# Patient Record
Sex: FEMALE | Race: BLACK OR AFRICAN AMERICAN | NOT HISPANIC OR LATINO
[De-identification: names, ages, dates, MRNs, and addresses within clinical notes are randomized per-mention and may not be internally consistent; named-entity substitution may affect disease eponyms.]

---

## 2019-01-30 ENCOUNTER — TRANSCRIPTION ENCOUNTER (OUTPATIENT)
Age: 29
End: 2019-01-30

## 2019-11-21 ENCOUNTER — RESULT REVIEW (OUTPATIENT)
Age: 29
End: 2019-11-21

## 2019-12-10 ENCOUNTER — APPOINTMENT (OUTPATIENT)
Dept: ANTEPARTUM | Facility: CLINIC | Age: 29
End: 2019-12-10
Payer: COMMERCIAL

## 2019-12-10 ENCOUNTER — LABORATORY RESULT (OUTPATIENT)
Age: 29
End: 2019-12-10

## 2019-12-10 ENCOUNTER — ASOB RESULT (OUTPATIENT)
Age: 29
End: 2019-12-10

## 2019-12-10 PROCEDURE — 76813 OB US NUCHAL MEAS 1 GEST: CPT

## 2019-12-10 PROCEDURE — 36416 COLLJ CAPILLARY BLOOD SPEC: CPT

## 2019-12-10 PROCEDURE — 76801 OB US < 14 WKS SINGLE FETUS: CPT

## 2019-12-24 PROBLEM — Z00.00 ENCOUNTER FOR PREVENTIVE HEALTH EXAMINATION: Status: ACTIVE | Noted: 2019-12-24

## 2020-01-07 ENCOUNTER — APPOINTMENT (OUTPATIENT)
Dept: ANTEPARTUM | Facility: CLINIC | Age: 30
End: 2020-01-07

## 2020-04-26 ENCOUNTER — MESSAGE (OUTPATIENT)
Age: 30
End: 2020-04-26

## 2020-08-08 ENCOUNTER — TRANSCRIPTION ENCOUNTER (OUTPATIENT)
Age: 30
End: 2020-08-08

## 2021-03-30 ENCOUNTER — APPOINTMENT (OUTPATIENT)
Dept: DERMATOLOGY | Facility: CLINIC | Age: 31
End: 2021-03-30

## 2021-04-07 ENCOUNTER — APPOINTMENT (OUTPATIENT)
Dept: DERMATOLOGY | Facility: CLINIC | Age: 31
End: 2021-04-07
Payer: COMMERCIAL

## 2021-04-07 ENCOUNTER — NON-APPOINTMENT (OUTPATIENT)
Age: 31
End: 2021-04-07

## 2021-04-07 VITALS — HEIGHT: 68 IN | BODY MASS INDEX: 24.25 KG/M2 | WEIGHT: 160 LBS

## 2021-04-07 DIAGNOSIS — L91.0 HYPERTROPHIC SCAR: ICD-10-CM

## 2021-04-07 DIAGNOSIS — L81.0 POSTINFLAMMATORY HYPERPIGMENTATION: ICD-10-CM

## 2021-04-07 DIAGNOSIS — L73.0 ACNE KELOID: ICD-10-CM

## 2021-04-07 PROCEDURE — 99204 OFFICE O/P NEW MOD 45 MIN: CPT

## 2021-04-07 PROCEDURE — 99072 ADDL SUPL MATRL&STAF TM PHE: CPT

## 2021-04-07 RX ORDER — TRETINOIN 0.5 MG/G
0.05 CREAM TOPICAL
Qty: 1 | Refills: 6 | Status: ACTIVE | COMMUNITY
Start: 2021-04-07 | End: 1900-01-01

## 2021-07-26 ENCOUNTER — APPOINTMENT (OUTPATIENT)
Dept: NEUROLOGY | Facility: CLINIC | Age: 31
End: 2021-07-26

## 2021-08-11 ENCOUNTER — TRANSCRIPTION ENCOUNTER (OUTPATIENT)
Age: 31
End: 2021-08-11

## 2021-11-14 ENCOUNTER — EMERGENCY (EMERGENCY)
Facility: HOSPITAL | Age: 31
LOS: 1 days | Discharge: HOME | End: 2021-11-14
Attending: STUDENT IN AN ORGANIZED HEALTH CARE EDUCATION/TRAINING PROGRAM | Admitting: EMERGENCY MEDICINE
Payer: COMMERCIAL

## 2021-11-14 VITALS
RESPIRATION RATE: 16 BRPM | HEART RATE: 76 BPM | SYSTOLIC BLOOD PRESSURE: 116 MMHG | DIASTOLIC BLOOD PRESSURE: 72 MMHG | OXYGEN SATURATION: 100 %

## 2021-11-14 VITALS
HEART RATE: 78 BPM | RESPIRATION RATE: 16 BRPM | DIASTOLIC BLOOD PRESSURE: 75 MMHG | SYSTOLIC BLOOD PRESSURE: 115 MMHG | OXYGEN SATURATION: 97 % | TEMPERATURE: 98 F

## 2021-11-14 PROCEDURE — 99284 EMERGENCY DEPT VISIT MOD MDM: CPT

## 2021-11-14 RX ORDER — METOCLOPRAMIDE HCL 10 MG
10 TABLET ORAL ONCE
Refills: 0 | Status: COMPLETED | OUTPATIENT
Start: 2021-11-14 | End: 2021-11-14

## 2021-11-14 RX ORDER — SODIUM CHLORIDE 9 MG/ML
1000 INJECTION INTRAMUSCULAR; INTRAVENOUS; SUBCUTANEOUS ONCE
Refills: 0 | Status: COMPLETED | OUTPATIENT
Start: 2021-11-14 | End: 2021-11-14

## 2021-11-14 RX ORDER — ACETAMINOPHEN 500 MG
975 TABLET ORAL ONCE
Refills: 0 | Status: COMPLETED | OUTPATIENT
Start: 2021-11-14 | End: 2021-11-14

## 2021-11-14 RX ORDER — DIPHENHYDRAMINE HCL 50 MG
25 CAPSULE ORAL ONCE
Refills: 0 | Status: COMPLETED | OUTPATIENT
Start: 2021-11-14 | End: 2021-11-14

## 2021-11-14 RX ORDER — MAGNESIUM SULFATE 500 MG/ML
2 VIAL (ML) INJECTION ONCE
Refills: 0 | Status: COMPLETED | OUTPATIENT
Start: 2021-11-14 | End: 2021-11-14

## 2021-11-14 RX ADMIN — Medication 50 GRAM(S): at 20:52

## 2021-11-14 RX ADMIN — SODIUM CHLORIDE 1000 MILLILITER(S): 9 INJECTION INTRAMUSCULAR; INTRAVENOUS; SUBCUTANEOUS at 20:45

## 2021-11-14 RX ADMIN — Medication 975 MILLIGRAM(S): at 20:38

## 2021-11-14 RX ADMIN — Medication 25 MILLIGRAM(S): at 20:46

## 2021-11-14 RX ADMIN — Medication 10 MILLIGRAM(S): at 20:43

## 2021-11-14 NOTE — ED PROVIDER NOTE - PHYSICAL EXAMINATION
Attending MD Moyer :   PHYSICAL EXAM:    GENERAL: NAD  HEENT:  Atraumatic  CHEST/LUNG: Chest rise equal bilaterally  HEART: Regular rate and rhythm  ABDOMEN: Soft, Nontender, Nondistended  EXTREMITIES:  Extremities warm  PSYCH: A&Ox3  SKIN: No obvious rashes or lesions  NEUROLOGY: strength and sensation intact in all extremities. CN 2 - 12 intact. Finger to nose test intact. No pronator drift. Ambulatory without difficulty.

## 2021-11-14 NOTE — ED PROVIDER NOTE - PATIENT PORTAL LINK FT
You can access the FollowMyHealth Patient Portal offered by [facility] by registering at the following website: [email or web address]. By joining [employer]’s FollowMyHealth portal, you will also be able to view your health information using other applications (apps) compatible with our system.

## 2021-11-14 NOTE — ED ADULT NURSE NOTE - NSIMPLEMENTINTERV_GEN_ALL_ED
Implemented All Universal Safety Interventions:  [place] to call system. Call bell, personal items and telephone within reach. Instruct patient to call for assistance. Room bathroom lighting operational. Non-slip footwear when patient is off stretcher. Physically safe environment: no spills, clutter or unnecessary equipment. Stretcher in lowest position, wheels locked, appropriate side rails in place.

## 2021-11-14 NOTE — ED ADULT TRIAGE NOTE - CHIEF COMPLAINT QUOTE
pt c/o headache x 3 days. pt w hx of migraines, endorses getting them monthly during menstrual cycle, however pain is worse this time. pt denies vision changes, chest pain, numbness/tingling, dizziness.

## 2021-11-14 NOTE — ED ADULT NURSE NOTE - OBJECTIVE STATEMENT
Pt received to intake 5, A&OX4, ambulatory. C./o migraine that began about 3 days ago. States she gets migraines during her menstrual cycles, but not in between menstruation. States she took ibuprofen 800mg at home without relief. Denies CP, SOB, dizziness, blurry vision, photophobia, fatigue. 20G IV placed to L AC. Medicated per orders. NSR on CM. Will continue to monitor.

## 2021-11-14 NOTE — ED PROVIDER NOTE - OBJECTIVE STATEMENT
30 y/o female with a hx of Migraines presents to the ER c/o 3 days of left sided headache.  Pt states migraine is similar to her typical symptoms but more intense.  Pt is followed by a neurologist.  Pt denies chest pain, sob, nausea, vomiting, fevers, chills, change in vision, weakness, dizziness. 30 y/o female with a hx of Migraines presents to the ER c/o 3 days of left sided dull headache.  Pt states migraine is similar to her typical symptoms but more intense.  Usually gets these symptoms around the time of her menstruation. No other assoc sxs. No N/V/weakness/numbness/tingling. Pt is followed by a neurologist whom recommend motrin for abortive therapy, but this has not been working.

## 2021-11-14 NOTE — ED PROVIDER NOTE - CLINICAL SUMMARY MEDICAL DECISION MAKING FREE TEXT BOX
32 y/o female with a hx of Migraines presents to the ER c/o 3 days of left sided headache.  Pt states migraine is similar to her typical symptoms but more intense.  Pt is followed by a neurologist. Pt is well appearing, NAD, normal neuro exam, supportive care, follow up Neuro. 32 y/o female with a hx of Migraines presents to the ER c/o 3 days of left sided headache.  Pt states migraine is similar to her typical symptoms but more intense.  Pt is followed by a neurologist. Pt is well appearing, NAD, normal neuro exam, supportive care, follow up Neuro.    Attending MD Moyer: Agree with above. Pt here with L sided headache typoical of her usual migraine headaches. Neuro exam normal. Patient appears extremely comfortbale, texting and talking on her phone. VSS. Will give meds and reassess. Likely d/c.

## 2021-11-14 NOTE — ED PROVIDER NOTE - NSFOLLOWUPINSTRUCTIONS_ED_ALL_ED_FT
Please return to the ED for any concerns, including worsening headache, nausea, vomiting, weakness, numbness or any other concerns.     You may take tylenol 1000mg and ibuprofen 400mg every 6 hours as needed for pain.    Please follow-up with your primary care doctor in the next 3 days.

## 2021-11-29 ENCOUNTER — TRANSCRIPTION ENCOUNTER (OUTPATIENT)
Age: 31
End: 2021-11-29

## 2022-03-31 PROBLEM — G43.909 MIGRAINE, UNSPECIFIED, NOT INTRACTABLE, WITHOUT STATUS MIGRAINOSUS: Chronic | Status: ACTIVE | Noted: 2021-11-14

## 2022-05-29 ENCOUNTER — EMERGENCY (EMERGENCY)
Facility: HOSPITAL | Age: 32
LOS: 1 days | Discharge: HOME | End: 2022-05-29
Attending: EMERGENCY MEDICINE | Admitting: STUDENT IN AN ORGANIZED HEALTH CARE EDUCATION/TRAINING PROGRAM
Payer: COMMERCIAL

## 2022-05-29 VITALS
OXYGEN SATURATION: 98 % | HEART RATE: 125 BPM | TEMPERATURE: 98 F | DIASTOLIC BLOOD PRESSURE: 80 MMHG | RESPIRATION RATE: 18 BRPM | SYSTOLIC BLOOD PRESSURE: 126 MMHG

## 2022-05-29 LAB
ALBUMIN SERPL ELPH-MCNC: 3.6 G/DL — SIGNIFICANT CHANGE UP (ref 3.3–5)
ALP SERPL-CCNC: 58 U/L — SIGNIFICANT CHANGE UP (ref 40–120)
ALT FLD-CCNC: 25 U/L — SIGNIFICANT CHANGE UP (ref 4–33)
ANION GAP SERPL CALC-SCNC: 11 MMOL/L — SIGNIFICANT CHANGE UP (ref 7–14)
ANISOCYTOSIS BLD QL: SLIGHT — SIGNIFICANT CHANGE UP
AST SERPL-CCNC: 22 U/L — SIGNIFICANT CHANGE UP (ref 4–32)
BASOPHILS # BLD AUTO: 0.08 K/UL — SIGNIFICANT CHANGE UP (ref 0–0.2)
BASOPHILS NFR BLD AUTO: 0.9 % — SIGNIFICANT CHANGE UP (ref 0–2)
BILIRUB SERPL-MCNC: 0.5 MG/DL — SIGNIFICANT CHANGE UP (ref 0.2–1.2)
BLD GP AB SCN SERPL QL: NEGATIVE — SIGNIFICANT CHANGE UP
BUN SERPL-MCNC: 7 MG/DL — SIGNIFICANT CHANGE UP (ref 7–23)
CALCIUM SERPL-MCNC: 8.3 MG/DL — LOW (ref 8.4–10.5)
CHLORIDE SERPL-SCNC: 104 MMOL/L — SIGNIFICANT CHANGE UP (ref 98–107)
CO2 SERPL-SCNC: 23 MMOL/L — SIGNIFICANT CHANGE UP (ref 22–31)
CREAT SERPL-MCNC: 0.87 MG/DL — SIGNIFICANT CHANGE UP (ref 0.5–1.3)
EGFR: 92 ML/MIN/1.73M2 — SIGNIFICANT CHANGE UP
EOSINOPHIL # BLD AUTO: 0.08 K/UL — SIGNIFICANT CHANGE UP (ref 0–0.5)
EOSINOPHIL NFR BLD AUTO: 0.9 % — SIGNIFICANT CHANGE UP (ref 0–6)
FLUAV AG NPH QL: SIGNIFICANT CHANGE UP
FLUBV AG NPH QL: SIGNIFICANT CHANGE UP
GIANT PLATELETS BLD QL SMEAR: PRESENT — SIGNIFICANT CHANGE UP
GLUCOSE SERPL-MCNC: 115 MG/DL — HIGH (ref 70–99)
HCG SERPL-ACNC: <5 MIU/ML — SIGNIFICANT CHANGE UP
HCT VFR BLD CALC: 22.1 % — LOW (ref 34.5–45)
HGB BLD-MCNC: 6.8 G/DL — CRITICAL LOW (ref 11.5–15.5)
IANC: 5.64 K/UL — SIGNIFICANT CHANGE UP (ref 1.8–7.4)
LYMPHOCYTES # BLD AUTO: 2.01 K/UL — SIGNIFICANT CHANGE UP (ref 1–3.3)
LYMPHOCYTES # BLD AUTO: 21.4 % — SIGNIFICANT CHANGE UP (ref 13–44)
MACROCYTES BLD QL: SLIGHT — SIGNIFICANT CHANGE UP
MANUAL SMEAR VERIFICATION: SIGNIFICANT CHANGE UP
MCHC RBC-ENTMCNC: 28.2 PG — SIGNIFICANT CHANGE UP (ref 27–34)
MCHC RBC-ENTMCNC: 30.8 GM/DL — LOW (ref 32–36)
MCV RBC AUTO: 91.7 FL — SIGNIFICANT CHANGE UP (ref 80–100)
MONOCYTES # BLD AUTO: 0.08 K/UL — SIGNIFICANT CHANGE UP (ref 0–0.9)
MONOCYTES NFR BLD AUTO: 0.9 % — LOW (ref 2–14)
NEUTROPHILS # BLD AUTO: 7.14 K/UL — SIGNIFICANT CHANGE UP (ref 1.8–7.4)
NEUTROPHILS NFR BLD AUTO: 75.9 % — SIGNIFICANT CHANGE UP (ref 43–77)
NRBC # BLD: 2 /100 — HIGH (ref 0–0)
PLAT MORPH BLD: ABNORMAL
PLATELET # BLD AUTO: 293 K/UL — SIGNIFICANT CHANGE UP (ref 150–400)
PLATELET COUNT - ESTIMATE: NORMAL — SIGNIFICANT CHANGE UP
POTASSIUM SERPL-MCNC: 3.6 MMOL/L — SIGNIFICANT CHANGE UP (ref 3.5–5.3)
POTASSIUM SERPL-SCNC: 3.6 MMOL/L — SIGNIFICANT CHANGE UP (ref 3.5–5.3)
PROT SERPL-MCNC: 6.6 G/DL — SIGNIFICANT CHANGE UP (ref 6–8.3)
RBC # BLD: 2.41 M/UL — LOW (ref 3.8–5.2)
RBC # FLD: 14.2 % — SIGNIFICANT CHANGE UP (ref 10.3–14.5)
RBC BLD AUTO: SIGNIFICANT CHANGE UP
RH IG SCN BLD-IMP: POSITIVE — SIGNIFICANT CHANGE UP
RH IG SCN BLD-IMP: POSITIVE — SIGNIFICANT CHANGE UP
RSV RNA NPH QL NAA+NON-PROBE: SIGNIFICANT CHANGE UP
SARS-COV-2 RNA SPEC QL NAA+PROBE: SIGNIFICANT CHANGE UP
SODIUM SERPL-SCNC: 138 MMOL/L — SIGNIFICANT CHANGE UP (ref 135–145)
WBC # BLD: 9.41 K/UL — SIGNIFICANT CHANGE UP (ref 3.8–10.5)
WBC # FLD AUTO: 9.41 K/UL — SIGNIFICANT CHANGE UP (ref 3.8–10.5)

## 2022-05-29 PROCEDURE — 99218: CPT

## 2022-05-29 RX ORDER — SODIUM CHLORIDE 9 MG/ML
1000 INJECTION INTRAMUSCULAR; INTRAVENOUS; SUBCUTANEOUS ONCE
Refills: 0 | Status: COMPLETED | OUTPATIENT
Start: 2022-05-29 | End: 2022-05-29

## 2022-05-29 RX ORDER — ACETAMINOPHEN 500 MG
650 TABLET ORAL ONCE
Refills: 0 | Status: COMPLETED | OUTPATIENT
Start: 2022-05-29 | End: 2022-05-29

## 2022-05-29 RX ORDER — KETOROLAC TROMETHAMINE 30 MG/ML
30 SYRINGE (ML) INJECTION ONCE
Refills: 0 | Status: DISCONTINUED | OUTPATIENT
Start: 2022-05-29 | End: 2022-05-29

## 2022-05-29 RX ORDER — METOCLOPRAMIDE HCL 10 MG
10 TABLET ORAL ONCE
Refills: 0 | Status: COMPLETED | OUTPATIENT
Start: 2022-05-29 | End: 2022-05-29

## 2022-05-29 RX ADMIN — SODIUM CHLORIDE 1000 MILLILITER(S): 9 INJECTION INTRAMUSCULAR; INTRAVENOUS; SUBCUTANEOUS at 19:31

## 2022-05-29 RX ADMIN — Medication 30 MILLIGRAM(S): at 20:51

## 2022-05-29 RX ADMIN — Medication 650 MILLIGRAM(S): at 19:31

## 2022-05-29 RX ADMIN — Medication 1 TABLET(S): at 23:32

## 2022-05-29 RX ADMIN — Medication 10 MILLIGRAM(S): at 19:31

## 2022-05-29 RX ADMIN — Medication 650 MILLIGRAM(S): at 20:52

## 2022-05-29 NOTE — ED PROVIDER NOTE - NS ED ATTENDING STATEMENT MOD
This was a shared visit with the HIRAM. I reviewed and verified the documentation and independently performed the documented:

## 2022-05-29 NOTE — ED ADULT NURSE NOTE - OBJECTIVE STATEMENT
Pt received from shantal RN, to room intake 12. Patient with C/O HA, states it began today with no other symptoms. Patient with hx of migraines, believes it is an exacerbation of this. Patient S/P BBL x2 days. Patient denies any complications r/t surgery. No fevers, chills, incision drainage, vision changes, neuro deficits noted. 20G IV placed to LAC by dayshift RN. Awaiting further orders at this time.

## 2022-05-29 NOTE — ED ADULT TRIAGE NOTE - TEMPERATURE IN CELSIUS (DEGREES C)
Recommend Palliative Care consult.    Topical Recommendations:  Tracheostomy- Cleanse around trach site with NS. Pat dry. Apply Silicone foam dressing without border beneath trach collar, cut mid dressing to "Y" shape. Change foam dressing every shift and prn. Change trach ties every 3 days (if safe to do so, patient with critical airway). Place small z-toni positioning device behind head beginning below the shoulders for slight hyperextension of neck to prevent increased pressure and moisture caused by head flexion.     Nasogastric Tube- Cleanse nare with NS. Pat dry. Apply Liquid barrier film to periwound skin. Apply Stat-lock NGT ann over center of nare, not touch any skin circumferentially. Change every three days or prn if soiled or compromised.     Moisture associated dermatitis to intertriginous fold of neck- gently cleanse with NS, pat dry. Apply 3M Liquid Barrier Advanced three times a week. Apply Interdry textile sheeting,  leaving 2 inches exposed at ends to wick, remove to wash & dry affected area, then replace. Individual sheeting may be used for up to 5 days unless soiled.     Risk for moisture associated dermatitis to abdominal pannus- cleanse with luke-warm soap and water, dry well. Apply Interdry textile sheeting, under intertriginous folds leaving 2 inches exposed at ends to wick, remove to wash & dry affected area, then replace. Individual sheeting may be used for up to 5 days unless soiled.     Continue low air loss bed therapy, continue heel elevation with Z-flex fluidized positioning boots, continue to turn & reposition q2h with Z-toni fluidized positioning device, continue moisture management with mayela moisture barrier cream to sacral fold and bilateral buttocks & single breathable pad, continue measures to decrease friction/shear/pressure.     Continue with nutritional support as per dietary/orders.    Findings and plan discussed with primary RN and primary. All questions and concerns addressed.    Please contact Wound Care Service Line if we can be of further assistance (ext [phone]).  Right thumb: Apply Liquid barrier film. Daily.    PEG: Cleanse q shift with NS, apply liquid barrier film beneath candelario disc.  If redness noted under candelario disc bumper apply thin foam  dressing without border (Mepilex Lite)- cut to mid dressing with a 'Y' shape.   Secondary securement to abdomen taping in 'H' fashion with Steri-strips.     Continue low air loss bed therapy, continue heel elevation, continue to turn & reposition q2h, continue moisture management with barrier creams & single breathable pad, continue measures to decrease friction/shear/pressure. Continue with nutritional support as per dietary/orders.     Please call wound care service line is further assistance is needed ([phone]).  36.7

## 2022-05-29 NOTE — ED PROVIDER NOTE - PROGRESS NOTE DETAILS
PA Smartt: patient H/H: 6.8/22.1. patient denies actively bleeding. anemia likely due to recent surgurical procedure. patient accepted to CDU for transfusion of 2 units.

## 2022-05-29 NOTE — ED CDU PROVIDER INITIAL DAY NOTE - OBJECTIVE STATEMENT
32 y/o female with a PMHx of migraines presents to the ED with exacerbation of headache as of 2 days Headache is described as a pressure sensation located to the frontal and bilateral region. Pt denies visual disruption, nausea, vomiting, slurred speech, weakness, and unsteady gait. denies any chest pain, sob, leia swelling, or post surgical wound complications. Pt is 4 day status post Brazilian Butt lift procedure and believes that anesthesia is the cause of the headache.  IN ER given meds with some relief, however on labs, hgb found to be 6.8. pty will be sent to cdu for PRBC, meds, reasses

## 2022-05-29 NOTE — ED CDU PROVIDER INITIAL DAY NOTE - ATTENDING APP SHARED VISIT CONTRIBUTION OF CARE
Please see my ED provider note for more details with regards to the HPI and MDM    in review the patient has migraines and history of migraines.  Could be typical migraine but also found to be profoundly anemic in the setting of recent surgery.  No signs of surgical complications.  Will transfuse as may be contributing to the HA.  Will be transfused and reassessed in the CDU

## 2022-05-29 NOTE — ED PROVIDER NOTE - OBJECTIVE STATEMENT
32 y/o female with a PMHx of migraines presents to the ED with exacerbation of headache as of today. Headache is described as a pressure sensation located to the frontal and bilateral region. Pt denies visual disruption, nausea, vomiting, slurred speech, weakness, and unsteady gait. Pt is 1 day status post BBL and believes that anesthesia is the cause of the headache. No other acute complaints noted such as chest pain, shortness of breath, lower leg swelling, and issues of post surgical wound. 30 y/o female with a PMHx of migraines presents to the ED with exacerbation of headache as of today. Headache is described as a pressure sensation located to the frontal and bilateral region. Pt denies visual disruption, nausea, vomiting, slurred speech, weakness, and unsteady gait. Pt is 4 day status post Brazilian Butt lift procedure and believes that anesthesia is the cause of the headache. No other acute complaints noted such as chest pain, shortness of breath, lower leg swelling, and issues of post surgical wound.

## 2022-05-29 NOTE — ED ADULT TRIAGE NOTE - CHIEF COMPLAINT QUOTE
p/t s/p but lift sx 4 days ago, c.o of headaches for 3 days, denies any other discomfort, no neuro deficits noted

## 2022-05-29 NOTE — ED CDU PROVIDER INITIAL DAY NOTE - MEDICAL DECISION MAKING DETAILS
32 y/o female with a PMHx of migraines presents to the ED with exacerbation of headache as of 2 days Headache is described as a pressure sensation located to the frontal and bilateral region. Pt denies visual disruption, nausea, vomiting, slurred speech, weakness, and unsteady gait. denies any chest pain, sob, leia swelling, or post surgical wound complications. Pt is 4 day status post Brazilian Butt lift procedure and believes that anesthesia is the cause of the headache.  IN ER given meds with some relief, exam was wnl  however on labs, hgb found to be 6.8. pty will be sent to cdu for PRBC, meds, reasses

## 2022-05-30 VITALS
HEART RATE: 96 BPM | TEMPERATURE: 98 F | RESPIRATION RATE: 17 BRPM | DIASTOLIC BLOOD PRESSURE: 80 MMHG | OXYGEN SATURATION: 100 % | SYSTOLIC BLOOD PRESSURE: 121 MMHG

## 2022-05-30 LAB
HCT VFR BLD CALC: 27.8 % — LOW (ref 34.5–45)
HGB BLD-MCNC: 9 G/DL — LOW (ref 11.5–15.5)
MCHC RBC-ENTMCNC: 28.3 PG — SIGNIFICANT CHANGE UP (ref 27–34)
MCHC RBC-ENTMCNC: 32.4 GM/DL — SIGNIFICANT CHANGE UP (ref 32–36)
MCV RBC AUTO: 87.4 FL — SIGNIFICANT CHANGE UP (ref 80–100)
NRBC # BLD: 0 /100 WBCS — SIGNIFICANT CHANGE UP
NRBC # FLD: 0.02 K/UL — HIGH
PLATELET # BLD AUTO: 248 K/UL — SIGNIFICANT CHANGE UP (ref 150–400)
RBC # BLD: 3.18 M/UL — LOW (ref 3.8–5.2)
RBC # FLD: 14.3 % — SIGNIFICANT CHANGE UP (ref 10.3–14.5)
WBC # BLD: 8.72 K/UL — SIGNIFICANT CHANGE UP (ref 3.8–10.5)
WBC # FLD AUTO: 8.72 K/UL — SIGNIFICANT CHANGE UP (ref 3.8–10.5)

## 2022-05-30 PROCEDURE — 99217: CPT

## 2022-05-30 RX ORDER — DOCUSATE SODIUM 100 MG
1 CAPSULE ORAL
Qty: 28 | Refills: 0
Start: 2022-05-30 | End: 2022-06-12

## 2022-05-30 RX ORDER — IBUPROFEN 200 MG
600 TABLET ORAL ONCE
Refills: 0 | Status: COMPLETED | OUTPATIENT
Start: 2022-05-30 | End: 2022-05-30

## 2022-05-30 RX ORDER — FERROUS SULFATE 325(65) MG
1 TABLET ORAL
Qty: 14 | Refills: 0
Start: 2022-05-30 | End: 2022-06-12

## 2022-05-30 RX ORDER — KETOROLAC TROMETHAMINE 30 MG/ML
15 SYRINGE (ML) INJECTION ONCE
Refills: 0 | Status: DISCONTINUED | OUTPATIENT
Start: 2022-05-30 | End: 2022-05-30

## 2022-05-30 RX ORDER — ACETAMINOPHEN 500 MG
650 TABLET ORAL ONCE
Refills: 0 | Status: COMPLETED | OUTPATIENT
Start: 2022-05-30 | End: 2022-05-30

## 2022-05-30 RX ORDER — METOCLOPRAMIDE HCL 10 MG
10 TABLET ORAL ONCE
Refills: 0 | Status: COMPLETED | OUTPATIENT
Start: 2022-05-30 | End: 2022-05-30

## 2022-05-30 RX ADMIN — Medication 650 MILLIGRAM(S): at 08:01

## 2022-05-30 RX ADMIN — Medication 600 MILLIGRAM(S): at 00:18

## 2022-05-30 RX ADMIN — Medication 15 MILLIGRAM(S): at 10:56

## 2022-05-30 RX ADMIN — Medication 10 MILLIGRAM(S): at 10:56

## 2022-05-30 RX ADMIN — Medication 1 TABLET(S): at 10:56

## 2022-05-30 RX ADMIN — Medication 650 MILLIGRAM(S): at 08:31

## 2022-05-30 RX ADMIN — Medication 15 MILLIGRAM(S): at 11:26

## 2022-05-30 RX ADMIN — Medication 600 MILLIGRAM(S): at 01:18

## 2022-05-30 NOTE — ED CDU PROVIDER SUBSEQUENT DAY NOTE - HISTORY
30 y/o female with a PMHx of migraines presents to the ED with exacerbation of headache as of 2 days Headache is described as a pressure sensation located to the frontal and bilateral region. Pt denies visual disruption, nausea, vomiting, slurred speech, weakness, and unsteady gait. denies any chest pain, sob, leia swelling, or post surgical wound complications. Pt is 4 day status post Brazilian Butt lift procedure and believes that anesthesia is the cause of the headache.  IN ER given meds with some relief, however on labs, hgb found to be 6.8. pty will be sent to cdu for PRBC, meds, reasses

## 2022-05-30 NOTE — ED CDU PROVIDER DISPOSITION NOTE - ATTENDING CONTRIBUTION TO CARE
32 yo f past medical history migraines presented for HA after recent cosmetic surgery. found to be anemia sent to CDU for prbc and monitoring. symptoms improved. appropriate rise in h/h. stable for dc.

## 2022-05-30 NOTE — ED CDU PROVIDER DISPOSITION NOTE - PATIENT PORTAL LINK FT
You can access the FollowMyHealth Patient Portal offered by [facility] by registering at the following website: [email or web address]. By joining [employer]’s FollowMyHealth portal, you will also be able to view your health information using other applications (apps) compatible with our system.

## 2022-05-30 NOTE — ED CDU PROVIDER DISPOSITION NOTE - CLINICAL COURSE
Pt is a 32 y/o F PMHx migraines, recent BBL in [place] (5/25/22) p/w headache.  Pt reports developing pressure like headache at frontal and bilateral parietal region.  In ED, pt found to have anemia with Hgb of 6.8.  Pt denies any vaginal bleeding, melena or brbpr.  Pt placed in CDU for blood transfusion.  Pt reports improvement in headache after blood transfusion.  Repeat hemoglobin was *******. Pt is a 30 y/o F PMHx migraines, recent BBL in [place] (5/25/22) p/w headache.  Pt reports developing pressure like headache at frontal and bilateral parietal region.  In ED, pt found to have anemia with Hgb of 6.8.  Pt denies any vaginal bleeding, melena or brbpr.  Pt placed in CDU for blood transfusion.  Pt reports improvement in headache after blood transfusion.  Repeat hemoglobin was 9.0

## 2022-05-30 NOTE — ED CDU PROVIDER SUBSEQUENT DAY NOTE - ATTENDING APP SHARED VISIT CONTRIBUTION OF CARE
32 yo f past medical history migraines presented for HA after recent cosmetic surgery. found to be anemia sent to CDU for prbc and monitoring. symptoms improved. appropriate rise in h/h. exam as above. stable for dc.

## 2022-05-30 NOTE — ED CDU PROVIDER SUBSEQUENT DAY NOTE - PROGRESS NOTE DETAILS
cdu fifi botello: pt rested comfortably in bed overnight, reeceviing PRbcx2, had no compaints will reasses WALTER Lees:  Results endorsed including unexpected incidental findings (copy of reports provided to patient).  Pt reports feeling better.  Pt medically stable for discharge.  Strict return precautions given. Pt to follow up with PMD. Reassessment performed and plan for discharge discussed with Dr. Strong who agrees with disposition and discharge plan.

## 2022-05-30 NOTE — ED CDU PROVIDER DISPOSITION NOTE - NSFOLLOWUPINSTRUCTIONS_ED_ALL_ED_FT
Advance activity as tolerated.  Continue all previously prescribed medications as directed unless otherwise instructed.  Take Tylenol 650mg (Two 325 mg pills) every 4-6 hours as needed for pain or fevers. Take iron supplements as prescribed.  Take Colace 100 mg twice a day as needed for constipation.  Follow up with your primary care physician in 48-72 hours- bring copies of your results.  Return to the ER for worsening or persistent symptoms, including but not limited to worsening/persistent headache, lightheadedness, changes in vision/hearing, passing out, chest pain, shortness of breath, dizziness, difficulty walking, falls, and/or ANY NEW OR CONCERNING SYMPTOMS. If you have issues obtaining follow up, please call: [phone] to obtain a doctor or specialist who takes your insurance in your area.  You may call [phone] to make an appointment with the internal medicine clinic.

## 2022-07-07 ENCOUNTER — APPOINTMENT (OUTPATIENT)
Dept: NEUROLOGY | Facility: CLINIC | Age: 32
End: 2022-07-07

## 2022-08-01 ENCOUNTER — APPOINTMENT (OUTPATIENT)
Dept: DERMATOLOGY | Facility: CLINIC | Age: 32
End: 2022-08-01

## 2022-08-30 ENCOUNTER — NON-APPOINTMENT (OUTPATIENT)
Age: 32
End: 2022-08-30

## 2022-11-01 ENCOUNTER — EMERGENCY (EMERGENCY)
Facility: HOSPITAL | Age: 32
LOS: 1 days | Discharge: HOME | End: 2022-11-01
Attending: STUDENT IN AN ORGANIZED HEALTH CARE EDUCATION/TRAINING PROGRAM | Admitting: STUDENT IN AN ORGANIZED HEALTH CARE EDUCATION/TRAINING PROGRAM

## 2022-11-01 VITALS
RESPIRATION RATE: 16 BRPM | TEMPERATURE: 98 F | OXYGEN SATURATION: 100 % | SYSTOLIC BLOOD PRESSURE: 129 MMHG | DIASTOLIC BLOOD PRESSURE: 74 MMHG | HEART RATE: 64 BPM

## 2022-11-01 VITALS
HEART RATE: 70 BPM | TEMPERATURE: 98 F | SYSTOLIC BLOOD PRESSURE: 122 MMHG | DIASTOLIC BLOOD PRESSURE: 76 MMHG | OXYGEN SATURATION: 100 % | RESPIRATION RATE: 16 BRPM

## 2022-11-01 LAB
ALBUMIN SERPL ELPH-MCNC: 4.3 G/DL — SIGNIFICANT CHANGE UP (ref 3.3–5)
ALP SERPL-CCNC: 76 U/L — SIGNIFICANT CHANGE UP (ref 40–120)
ALT FLD-CCNC: 18 U/L — SIGNIFICANT CHANGE UP (ref 4–33)
ANION GAP SERPL CALC-SCNC: 9 MMOL/L — SIGNIFICANT CHANGE UP (ref 7–14)
AST SERPL-CCNC: 17 U/L — SIGNIFICANT CHANGE UP (ref 4–32)
BASOPHILS # BLD AUTO: 0.01 K/UL — SIGNIFICANT CHANGE UP (ref 0–0.2)
BASOPHILS NFR BLD AUTO: 0.1 % — SIGNIFICANT CHANGE UP (ref 0–2)
BILIRUB SERPL-MCNC: 0.3 MG/DL — SIGNIFICANT CHANGE UP (ref 0.2–1.2)
BUN SERPL-MCNC: 9 MG/DL — SIGNIFICANT CHANGE UP (ref 7–23)
CALCIUM SERPL-MCNC: 9 MG/DL — SIGNIFICANT CHANGE UP (ref 8.4–10.5)
CHLORIDE SERPL-SCNC: 105 MMOL/L — SIGNIFICANT CHANGE UP (ref 98–107)
CO2 SERPL-SCNC: 26 MMOL/L — SIGNIFICANT CHANGE UP (ref 22–31)
CREAT SERPL-MCNC: 0.65 MG/DL — SIGNIFICANT CHANGE UP (ref 0.5–1.3)
EGFR: 121 ML/MIN/1.73M2 — SIGNIFICANT CHANGE UP
EOSINOPHIL # BLD AUTO: 0.11 K/UL — SIGNIFICANT CHANGE UP (ref 0–0.5)
EOSINOPHIL NFR BLD AUTO: 1.6 % — SIGNIFICANT CHANGE UP (ref 0–6)
GLUCOSE SERPL-MCNC: 86 MG/DL — SIGNIFICANT CHANGE UP (ref 70–99)
HCG SERPL-ACNC: <5 MIU/ML — SIGNIFICANT CHANGE UP
HCT VFR BLD CALC: 37.9 % — SIGNIFICANT CHANGE UP (ref 34.5–45)
HGB BLD-MCNC: 11.9 G/DL — SIGNIFICANT CHANGE UP (ref 11.5–15.5)
IANC: 3.51 K/UL — SIGNIFICANT CHANGE UP (ref 1.8–7.4)
IMM GRANULOCYTES NFR BLD AUTO: 0.6 % — SIGNIFICANT CHANGE UP (ref 0–0.9)
LYMPHOCYTES # BLD AUTO: 2.9 K/UL — SIGNIFICANT CHANGE UP (ref 1–3.3)
LYMPHOCYTES # BLD AUTO: 41.5 % — SIGNIFICANT CHANGE UP (ref 13–44)
MCHC RBC-ENTMCNC: 29 PG — SIGNIFICANT CHANGE UP (ref 27–34)
MCHC RBC-ENTMCNC: 31.4 GM/DL — LOW (ref 32–36)
MCV RBC AUTO: 92.4 FL — SIGNIFICANT CHANGE UP (ref 80–100)
MONOCYTES # BLD AUTO: 0.42 K/UL — SIGNIFICANT CHANGE UP (ref 0–0.9)
MONOCYTES NFR BLD AUTO: 6 % — SIGNIFICANT CHANGE UP (ref 2–14)
NEUTROPHILS # BLD AUTO: 3.51 K/UL — SIGNIFICANT CHANGE UP (ref 1.8–7.4)
NEUTROPHILS NFR BLD AUTO: 50.2 % — SIGNIFICANT CHANGE UP (ref 43–77)
NRBC # BLD: 0 /100 WBCS — SIGNIFICANT CHANGE UP (ref 0–0)
NRBC # FLD: 0 K/UL — SIGNIFICANT CHANGE UP (ref 0–0)
PLATELET # BLD AUTO: 280 K/UL — SIGNIFICANT CHANGE UP (ref 150–400)
POTASSIUM SERPL-MCNC: 4 MMOL/L — SIGNIFICANT CHANGE UP (ref 3.5–5.3)
POTASSIUM SERPL-SCNC: 4 MMOL/L — SIGNIFICANT CHANGE UP (ref 3.5–5.3)
PROT SERPL-MCNC: 7.5 G/DL — SIGNIFICANT CHANGE UP (ref 6–8.3)
RBC # BLD: 4.1 M/UL — SIGNIFICANT CHANGE UP (ref 3.8–5.2)
RBC # FLD: 12.7 % — SIGNIFICANT CHANGE UP (ref 10.3–14.5)
SODIUM SERPL-SCNC: 140 MMOL/L — SIGNIFICANT CHANGE UP (ref 135–145)
WBC # BLD: 6.99 K/UL — SIGNIFICANT CHANGE UP (ref 3.8–10.5)
WBC # FLD AUTO: 6.99 K/UL — SIGNIFICANT CHANGE UP (ref 3.8–10.5)

## 2022-11-01 PROCEDURE — 99284 EMERGENCY DEPT VISIT MOD MDM: CPT

## 2022-11-01 RX ORDER — MAGNESIUM SULFATE 500 MG/ML
2 VIAL (ML) INJECTION ONCE
Refills: 0 | Status: COMPLETED | OUTPATIENT
Start: 2022-11-01 | End: 2022-11-01

## 2022-11-01 RX ORDER — METOCLOPRAMIDE HCL 10 MG
10 TABLET ORAL ONCE
Refills: 0 | Status: COMPLETED | OUTPATIENT
Start: 2022-11-01 | End: 2022-11-01

## 2022-11-01 RX ORDER — KETOROLAC TROMETHAMINE 30 MG/ML
15 SYRINGE (ML) INJECTION ONCE
Refills: 0 | Status: DISCONTINUED | OUTPATIENT
Start: 2022-11-01 | End: 2022-11-01

## 2022-11-01 RX ORDER — SODIUM CHLORIDE 9 MG/ML
1000 INJECTION INTRAMUSCULAR; INTRAVENOUS; SUBCUTANEOUS ONCE
Refills: 0 | Status: COMPLETED | OUTPATIENT
Start: 2022-11-01 | End: 2022-11-01

## 2022-11-01 RX ADMIN — Medication 10 MILLIGRAM(S): at 13:36

## 2022-11-01 RX ADMIN — Medication 25 GRAM(S): at 15:10

## 2022-11-01 RX ADMIN — Medication 15 MILLIGRAM(S): at 15:41

## 2022-11-01 RX ADMIN — Medication 15 MILLIGRAM(S): at 13:36

## 2022-11-01 RX ADMIN — SODIUM CHLORIDE 1000 MILLILITER(S): 9 INJECTION INTRAMUSCULAR; INTRAVENOUS; SUBCUTANEOUS at 13:37

## 2022-11-01 NOTE — ED ADULT TRIAGE NOTE - BEFAST LAST WELL KNOWN
SURGERY POST-OP NOTE      OPERATION ADMIT DATE POST-OP DAY    Appendectomy - Laparoscopic 1/25/2021 1 Day Post-Op     SUBJECTIVE    Ms. Ruddy Monet is a 34 year old female who is doing all right this afternoon. She continues to have some lower abdominal pain. States that the tylenol did not help with this but she will be trying norco. She has not been ambulating in the hallways yet but she did walk to the bathroom multiple times today.  Passing flatus but no BM yet.    PHYSICAL EXAM    Vital Signs   Last Value 24 Hour Range   Temperature 98.7 °F (37.1 °C) (01/26/21 1145) Temp  Min: 97.7 °F (36.5 °C)  Max: 99.7 °F (37.6 °C)   Pulse 96 (01/26/21 1145) Pulse  Min: 79  Max: 116   Respiratory 18 (01/26/21 1145) Resp  Min: 12  Max: 22   Blood Pressure (!) 133/102 (01/26/21 1145) BP  Min: 108/71  Max: 168/106   Pulse Oximetry 98 % (01/26/21 1145) SpO2  Min: 95 %  Max: 100 %   CVP   No data recorded     Weight: 61.8 kg (01/25/21 1508)  Admit weight:Weight: 61.8 kg (01/25/21 1508)  Constitutional: well developed, well nourished. No acute distress. Converses appropriately  Cardiovascular:  Normal heart rate. Extremities warm and well perfused.  Respiratory:  Non-labored breathing without accessory muscle use. No respiratory distress.    Abdomen:  Soft, non-distended. Tenderness to palpation to bilateral lower quadrants. Non-tender in bilateral upper quadrants. Lap sites are clean, dry, and intact without drainage present.        INTAKE & OUTPUT    This Shift 24 Hour Range   No intake/output data recorded. I/O last 3 completed shifts:  In: 2974.9 [P.O.:720; I.V.:2154.9; IV Piggyback:100]  Out: -      Last Stool Occurrence:      LABS    Recent Labs   Lab 01/25/21  1525 01/25/21  1519   WBC  --  15.9*   HCT  --  41.1   HGB  --  14.1   PLT  --  330   SODIUM  --  138   POTASSIUM  --  3.5   CHLORIDE  --  99   CO2  --  23   CALCIUM  --  9.7   GLUCOSE  --  122*   BUN  --  8   CREATININE 0.60 0.62   AST  --  34   GPT  --  106*    ALKPT  --  60   BILIRUBIN  --  0.4   ALBUMIN  --  4.2   LIPA  --  95       IMAGING  No new imaging     Xr Chest Ap Or Pa - Portable  Result Date: 1/25/2021  XR CHEST AP OR PA INDICATION:  cp COMPARISON:  None. FINDINGS: Single view chest The heart size and pulmonary vasculature, the tara and mediastinal structures are normal. The lungs are clear. There is no active infiltrate, pleural effusion or pneumothorax.   IMPRESSION: No acute cardiopulmonary disease.     Ct Abdomen Pelvis W Contrast  Result Date: 1/25/2021  CT ABDOMEN PELVIS W CONTRAST CLINICAL INFORMATION: Abdominal pain, nausea and vomiting COMPARISON: None TECHNIQUE:   IV contrast: 100 mL Isovue 300.   Oral contrast: None.   Technical comments: Standard technique.   FINDINGS: LOWER CHEST    Unremarkable. UPPER ABDOMEN   Liver: Unremarkable.   Gallbladder: Unremarkable.   Pancreas: Unremarkable.   Spleen: Unremarkable. RETROPERITONEUM   Adrenals: Unremarkable.   Kidneys: Unremarkable.   Lymph nodes: No lymphadenopathy. BOWEL AND PERITONEUM   The appendix is markedly dilated and there are periappendiceal inflammatory changes. Small appendicoliths are present. Imaging findings are most compatible with acute appendicitis. No evidence of periappendiceal abscess. Additionally, a few loops of small bowel as well as the descending colon demonstrates mild wall thickening. Fat stranding/inflammatory changes are demonstrated throughout the mesentery and there is a small amount of free fluid within the lower abdomen and pelvis. There is no pneumatosis or free intra-abdominal air. The distal esophageal wall is mildly thickened/edematous. VASCULATURE   The abdominal aorta is normal in caliber. PELVIS   The uterus and urinary bladder are unremarkable. BONES AND SOFT TISSUES   Mild degenerative changes at L5-S1.   IMPRESSION: 1.  Imaging findings are most compatible with acute appendicitis. No evidence of periappendiceal abscess or appendiceal perforation. 2.  Several  loops of small bowel, as well as the descending colon, demonstrate mild wall thickening, which may reflect enterocolitis. Enterocolitis may be infectious or inflammatory. Fat stranding/inflammatory changes are noted throughout the mesentery. The distal esophageal wall is also thickened/edematous, compatible with esophagitis.       ASSESSMENT    S/P Appendectomy - Laparoscopic POD: 1 Day Post-Op    PLAN    1. Pain control: tylenol, norco on board  2. Diet: will advance to full liquids. Advance as tolerated.   3. Medications: continue home fluoxetine  4. Antibiotics: currently on IV zosyn. Will switch to PO abx on discharge  5. Lopez: None Voiding without difficulty  6. Activity: Encourage pt to be out of bed and ambulate with assistance at least TID. Encourage IS use.  7. Wound care: Skin glue: Allow the skin glue over your incisions to come off on its own. Glue will typically start to come off after 1-2 weeks. Ok to shower and lightly wash incisions but do not pick at them or peel the glue off prematurely  8. Drains: None  9. DVT prophy: SCDs and ambulation  10. Dispo: Continue care on medical unit as diet is advanced. Patient having pain this afternoon, will try norco. Encouraged patient to ambulate in hallways and use IS.       Other issues stable.  Plan discussed with patient/or family.    JOSH Garcia  General Surgery  [phone]  Supervising Physician Dr. DOE Hayes DO     I have seen and examined the patient myself. The above findings and plan were discussed with the above writer.  I am in agreement with the above documentation.     Right lower quadrant pain require Norco  Still on clear liquid diet  Passed flatus, no bowel movement    Incision dry, clean, intact  Plan DC tomorrow with oral antibiotic for 7 days    Rosa Hayes DO  1/26/2021    Pager: [phone]   Unknown

## 2022-11-01 NOTE — ED PROVIDER NOTE - PHYSICAL EXAMINATION
Gen: Well appearing in NAD  Head: NC/AT  Neck: trachea midline  Resp:  No distress  Ext: no deformities  Neuro:  A&O appears non focal, intact no fical droop, no motor or sensory deficit   Skin:  Warm and dry as visualized  Psych:  Normal affect and mood Gen: Well appearing in NAD  Head: NC/AT  Neck: trachea midline  Resp:  No distress  Ext: no deformities  Neuro:  A&O appears non focal, intact no facial droop, no motor or sensory deficit   Skin:  Warm and dry as visualized  Psych:  Normal affect and mood

## 2022-11-01 NOTE — ED ADULT TRIAGE NOTE - CHIEF COMPLAINT QUOTE
migraine since Sunday, denies n/v, dizziness, vision changes, ambulatory to triage, speech clear hx of migraines

## 2022-11-01 NOTE — ED PROVIDER NOTE - PRO INTERPRETER NEED 2
English PA submitted via CoverMyMeds for Jornay PM 20 mg cap    Received notification that PA has been approved    [place]: [facility] - PA Case ID: [id number]  Outcome: Approved today  PA Case: [id number], Status: Approved, Coverage Starts on: 8/24/2020 12:00:00 AM, Coverage Ends on: 8/24/2021 12:00:00 AM.  Drug: Aileen Remak PM 20MG er capsules  Form: [employer] Electronic PA Form (1866 NCPDP)

## 2022-11-01 NOTE — ED ADULT NURSE NOTE - OBJECTIVE STATEMENT
A04, ambulatory 33 y/o female presents to the ED for migraine since Sunday. Patient sys she usually gets migraines around the time of her menstrual. Patient denies chest pain, shortness of breath, nausea and vomiting.

## 2022-11-01 NOTE — ED PROVIDER NOTE - PATIENT PORTAL LINK FT
You can access the FollowMyHealth Patient Portal offered by [facility] by registering at the following website: [email or web address]. By joining [employer]’s FollowMyHealth portal, you will also be able to view your health information using other applications (apps) compatible with our system.

## 2022-11-01 NOTE — ED PROVIDER NOTE - OBJECTIVE STATEMENT
31yo F ho migraines, follows with neurology, on triptans pw 3 days left sided headache consistent with her migraine. pt gtes monthly migraines assoc with her menstruation, sometimes resolves with triptan, other times comes to ED for treastment, current migraine not responding to triptan or tylenol, no fevers, chills, nausea, vomiting, vision changes or focal neuro complaints. very typical of her migraine and not worse than prior headaches that brought her to ED 31yo F ho migraines, follows with neurology, on triptans pw 3 days gradual onset left sided headache consistent with her migraine. pt gets monthly migraines assoc with her menstruation, sometimes resolves with triptan, other times comes to ED for treatment, current migraine not responding to triptan or tylenol, no fevers, chills, nausea, vomiting, vision changes or focal neuro complaints. very typical of her migraine and not worse than prior headaches that brought her to ED.

## 2022-11-01 NOTE — ED PROVIDER NOTE - CLINICAL SUMMARY MEDICAL DECISION MAKING FREE TEXT BOX
33yo F with migraines, pw headache similar to prior migraines not responding to her home meds, neuro intact, will check basic labs, (lat admission for migraine found to be anemic 2/2 surgery) and supportive care

## 2022-11-01 NOTE — ED PROVIDER NOTE - NSFOLLOWUPINSTRUCTIONS_ED_ALL_ED_FT
1. TAKE ALL MEDICATIONS AS DIRECTED.    2. FOR PAIN OR FEVER YOU CAN TAKE IBUPROFEN (MOTRIN, ADVIL) OR ACETAMINOPHEN (TYLENOL) AS NEEDED, AS DIRECTED ON PACKAGING.  3. FOLLOW UP WITH YOUR PRIMARY DOCTOR WITHIN 5 DAYS AS DIRECTED.  4. IF YOU HAD LABS OR IMAGING DONE, YOU WERE GIVEN COPIES OF ALL LABS AND/OR IMAGING RESULTS FROM YOUR ER VISIT--PLEASE TAKE THEM WITH YOU TO YOUR FOLLOW UP APPOINTMENTS.  5. IF NEEDED, CALL PATIENT ACCESS SERVICES AT [phone] TO FIND A PRIMARY CARE PHYSICIAN.  OR CALL [phone] TO MAKE AN APPOINTMENT WITH THE CLINIC.  6. RETURN TO THE ER FOR ANY WORSENING SYMPTOMS OR CONCERNS.    Follow up with your neurologist     Acute Headache    WHAT YOU NEED TO KNOW:    An acute headache is pain or discomfort that starts suddenly and gets worse quickly. You may have an acute headache only when you feel stress or eat certain foods. Other acute headache pain can happen every day, and sometimes several times a day.     DISCHARGE INSTRUCTIONS:    Return to the emergency department if:     You have severe pain.      You have numbness or weakness on one side of your face or body.      You have a headache that occurs after a blow to the head, a fall, or other trauma.       You have a headache, are forgetful or confused, or have trouble speaking.      You have a headache, stiff neck, and a fever.    Contact your healthcare provider if:     You have a constant headache and are vomiting.      You have a headache each day that does not get better, even after treatment.      You have changes in your headaches, or new symptoms that occur when you have a headache.      You have questions or concerns about your condition or care.    Medicines: You may need any of the following:     Prescription pain medicine may be given. The medicine your healthcare provider recommends will depend on the kind of headaches you have. You will need to take prescription headache medicines as directed to prevent a problem called rebound headache. These headaches happen with regular use of pain relievers for headache disorders.      NSAIDs, such as ibuprofen, help decrease swelling, pain, and fever. This medicine is available with or without a doctor's order. NSAIDs can cause stomach bleeding or kidney problems in certain people. If you take blood thinner medicine, always ask your healthcare provider if NSAIDs are safe for you. Always read the medicine label and follow directions.      Acetaminophen decreases pain and fever. It is available without a doctor's order. Ask how much to take and how often to take it. Follow directions. Read the labels of all other medicines you are using to see if they also contain acetaminophen, or ask your doctor or pharmacist. Acetaminophen can cause liver damage if not taken correctly. Do not use more than 3 grams (3,000 milligrams) total of acetaminophen in one day.       Antidepressants may be given for some kinds of headaches.       Take your medicine as directed. Contact your healthcare provider if you think your medicine is not helping or if you have side effects. Tell him or her if you are allergic to any medicine. Keep a list of the medicines, vitamins, and herbs you take. Include the amounts, and when and why you take them. Bring the list or the pill bottles to follow-up visits. Carry your medicine list with you in case of an emergency.    Manage your symptoms:     Apply heat or ice on the headache area. Use a heat or ice pack. For an ice pack, you can also put crushed ice in a plastic bag. Cover the pack or bag with a towel before you apply it to your skin. Ice and heat both help decrease pain, and heat also helps decrease muscle spasms. Apply heat for 20 to 30 minutes every 2 hours. Apply ice for 15 to 20 minutes every hour. Apply heat or ice for as long and for as many days as directed. You may alternate heat and ice.      Relax your muscles. Lie down in a comfortable position and close your eyes. Relax your muscles slowly. Start at your toes and work your way up your body.      Keep a record of your headaches. Write down when your headaches start and stop. Include your symptoms and what you were doing when the headache began. Record what you ate or drank for 24 hours before the headache started. Describe the pain and where it hurts. Keep track of what you did to treat your headache and if it worked.     Prevent an acute headache:     Avoid anything that triggers an acute headache. Examples include exposure to chemicals, going to high altitude, or not getting enough sleep. Create a regular sleep routine. Go to sleep at the same time and wake up at the same time each day. Do not use electronic devices before bedtime. These may trigger a headache or prevent you from sleeping well.      Do not smoke. Nicotine and other chemicals in cigarettes and cigars can trigger an acute headache or make it worse. Ask your healthcare provider for information if you currently smoke and need help to quit. E-cigarettes or smokeless tobacco still contain nicotine. Talk to your healthcare provider before you use these products.       Limit alcohol as directed. Alcohol can trigger an acute headache or make it worse. If you have cluster headaches, do not drink alcohol during an episode. For other types of headaches, ask your healthcare provider if it is safe for you to drink alcohol. Ask how much is safe for you to drink, and how often.      Exercise as directed. Exercise can reduce tension and help with headache pain. Aim for 30 minutes of physical activity on most days of the week. Your healthcare provider can help you create an exercise plan.      Eat a variety of healthy foods. Healthy foods include fruits, vegetables, low-fat dairy products, lean meats, fish, whole grains, and cooked beans. Your healthcare provider or dietitian can help you create meals plans if you need to avoid foods that trigger headaches.    Follow up with your healthcare provider as directed: Bring your headache record with you when you see your healthcare provider. Write down your questions so you remember to ask them during your visits.

## 2023-01-05 ENCOUNTER — APPOINTMENT (OUTPATIENT)
Dept: OBGYN | Facility: CLINIC | Age: 33
End: 2023-01-05
Payer: COMMERCIAL

## 2023-01-05 VITALS — HEART RATE: 75 BPM | DIASTOLIC BLOOD PRESSURE: 78 MMHG | SYSTOLIC BLOOD PRESSURE: 120 MMHG

## 2023-01-05 VITALS
DIASTOLIC BLOOD PRESSURE: 85 MMHG | WEIGHT: 176 LBS | HEART RATE: 76 BPM | BODY MASS INDEX: 26.67 KG/M2 | SYSTOLIC BLOOD PRESSURE: 125 MMHG | HEIGHT: 68 IN

## 2023-01-05 DIAGNOSIS — Z01.419 ENCOUNTER FOR GYNECOLOGICAL EXAMINATION (GENERAL) (ROUTINE) W/OUT ABNORMAL FINDINGS: ICD-10-CM

## 2023-01-05 DIAGNOSIS — Z78.9 OTHER SPECIFIED HEALTH STATUS: ICD-10-CM

## 2023-01-05 DIAGNOSIS — G43.909 MIGRAINE, UNSPECIFIED, NOT INTRACTABLE, W/OUT STATUS MIGRAINOSUS: ICD-10-CM

## 2023-01-05 PROCEDURE — 99385 PREV VISIT NEW AGE 18-39: CPT

## 2023-01-05 PROCEDURE — 99214 OFFICE O/P EST MOD 30 MIN: CPT | Mod: 25

## 2023-01-05 NOTE — REASON FOR VISIT
Patient Instructions by Akbar Hines MD at 12/25/17 08:10 AM     Author:  Akbar Hines MD Service:  (none) Author Type:  Physician     Filed:  12/25/17 08:10 AM Encounter Date:  12/23/2017 Status:  Signed     :  Akbar Hines MD (Physician)              Patient instructed to follow up with his regular Primary Care Physician or Walk-in Care if his symptoms fail to improve as anticipated, worsen, or new symptoms develop.  Please proceed to the nearest hospital Emergency Room or call 911 for medical emergencies.    Additional Educational Resources:  For additional resources regarding your symptoms, diagnosis, or further health information, please visit the Health Resources section on [email or web address] or the Online Health Resources section in "[employer].            Revision History        User Key Date/Time User Provider Type Action    > [N/A] 12/25/17 08:10 AM Akbar Hines MD Physician Sign             [Initial] : an initial consultation for

## 2023-01-05 NOTE — REVIEW OF SYSTEMS
[Patient Intake Form Reviewed] : Patient intake form was reviewed [Negative] : Heme/Lymph [Headache] : headache

## 2023-01-05 NOTE — HISTORY OF PRESENT ILLNESS
[Patient reported PAP Smear was normal] : Patient reported PAP Smear was normal [No] : Patient does not have concerns regarding sex [Condoms] : uses condoms [Y] : Patient is sexually active [Monogamous (Male Partner)] : is monogamous with a male partner [Regular Cycle Intervals] : periods have been regular [PapSmeardate] : 2020 [LMPDate] : 12/15/22  [PGHxTotal] : 2 [[facility] CenterxFulerm] : 1 [[facility] Centeriving] : 1 [PGHxABSpont] : 1 [FreeTextEntry1] : 12/15/22

## 2023-01-05 NOTE — PHYSICAL EXAM
[Chaperone Present] : A chaperone was present in the examining room during all aspects of the physical examination [Appropriately responsive] : appropriately responsive [Alert] : alert [No Acute Distress] : no acute distress [No Lymphadenopathy] : no lymphadenopathy [Soft] : soft [Non-tender] : non-tender [Non-distended] : non-distended [No HSM] : No HSM [No Lesions] : no lesions [No Mass] : no mass [Oriented x3] : oriented x3 [Examination Of The Breasts] : a normal appearance [No Discharge] : no discharge [No Masses] : no breast masses were palpable [Labia Majora] : normal [Labia Minora] : normal [Normal] : normal [Uterine Adnexae] : normal [No Tenderness] : no tenderness [Nl Sphincter Tone] : normal sphincter tone

## 2023-03-15 ENCOUNTER — NON-APPOINTMENT (OUTPATIENT)
Age: 33
End: 2023-03-15

## 2023-05-05 ENCOUNTER — NON-APPOINTMENT (OUTPATIENT)
Age: 33
End: 2023-05-05

## 2023-06-02 LAB
17OHP SERPL-MCNC: 86 NG/DL
ANDROST SERPL-MCNC: 132 NG/DL
C TRACH RRNA SPEC QL NAA+PROBE: NOT DETECTED
N GONORRHOEA RRNA SPEC QL NAA+PROBE: NOT DETECTED
SOURCE AMPLIFICATION: NORMAL

## 2023-06-05 ENCOUNTER — APPOINTMENT (OUTPATIENT)
Dept: OPHTHALMOLOGY | Facility: CLINIC | Age: 33
End: 2023-06-05

## 2023-06-07 ENCOUNTER — APPOINTMENT (OUTPATIENT)
Dept: OBGYN | Facility: CLINIC | Age: 33
End: 2023-06-07
Payer: COMMERCIAL

## 2023-06-07 VITALS
WEIGHT: 173 LBS | DIASTOLIC BLOOD PRESSURE: 81 MMHG | SYSTOLIC BLOOD PRESSURE: 129 MMHG | HEART RATE: 72 BPM | BODY MASS INDEX: 26.22 KG/M2 | HEIGHT: 68 IN

## 2023-06-07 DIAGNOSIS — L68.0 HIRSUTISM: ICD-10-CM

## 2023-06-07 PROCEDURE — 99213 OFFICE O/P EST LOW 20 MIN: CPT

## 2023-06-08 ENCOUNTER — ASOB RESULT (OUTPATIENT)
Age: 33
End: 2023-06-08

## 2023-06-08 ENCOUNTER — APPOINTMENT (OUTPATIENT)
Dept: OBGYN | Facility: CLINIC | Age: 33
End: 2023-06-08
Payer: COMMERCIAL

## 2023-06-08 PROCEDURE — 76830 TRANSVAGINAL US NON-OB: CPT

## 2023-06-13 ENCOUNTER — NON-APPOINTMENT (OUTPATIENT)
Age: 33
End: 2023-06-13

## 2023-06-13 LAB
ESTIMATED AVERAGE GLUCOSE: 111 MG/DL
HBA1C MFR BLD HPLC: 5.5 %
PROLACTIN SERPL-MCNC: 17 NG/ML
PROLACTIN SERPL-MCNC: 31 NG/ML
SHBG SERPL-SCNC: 29.1 NMOL/L
TESTOST FREE SERPL-MCNC: 3.6 PG/ML
TESTOST FREE SERPL-MCNC: 4.7 PG/ML
TESTOST SERPL-MCNC: 24.9 NG/DL
TESTOST SERPL-MCNC: 41.8 NG/DL

## 2023-06-14 ENCOUNTER — APPOINTMENT (OUTPATIENT)
Dept: OBGYN | Facility: CLINIC | Age: 33
End: 2023-06-14

## 2023-06-16 LAB
CANDIDA VAG CYTO: DETECTED
G VAGINALIS+PREV SP MTYP VAG QL MICRO: DETECTED
T VAGINALIS VAG QL WET PREP: NOT DETECTED
TSH SERPL-ACNC: 1.76 UIU/ML

## 2023-06-16 RX ORDER — METRONIDAZOLE 7.5 MG/G
0.75 GEL VAGINAL
Qty: 1 | Refills: 1 | Status: ACTIVE | COMMUNITY
Start: 2023-06-16 | End: 1900-01-01

## 2023-06-16 RX ORDER — FLUCONAZOLE 150 MG/1
150 TABLET ORAL DAILY
Qty: 1 | Refills: 2 | Status: ACTIVE | COMMUNITY
Start: 2023-06-16 | End: 1900-01-01

## 2023-06-22 ENCOUNTER — APPOINTMENT (OUTPATIENT)
Dept: OBGYN | Facility: CLINIC | Age: 33
End: 2023-06-22
Payer: COMMERCIAL

## 2023-06-22 VITALS — HEART RATE: 60 BPM | DIASTOLIC BLOOD PRESSURE: 55 MMHG | SYSTOLIC BLOOD PRESSURE: 83 MMHG

## 2023-06-22 VITALS
SYSTOLIC BLOOD PRESSURE: 109 MMHG | BODY MASS INDEX: 25.46 KG/M2 | HEIGHT: 68 IN | HEART RATE: 89 BPM | WEIGHT: 168 LBS | DIASTOLIC BLOOD PRESSURE: 73 MMHG

## 2023-06-22 VITALS — HEART RATE: 69 BPM | DIASTOLIC BLOOD PRESSURE: 65 MMHG | SYSTOLIC BLOOD PRESSURE: 99 MMHG

## 2023-06-22 PROCEDURE — 57454 BX/CURETT OF CERVIX W/SCOPE: CPT | Mod: 59

## 2023-07-03 LAB
ANDROST SERPL-MCNC: 92 NG/DL
CYTOLOGY CVX/VAG DOC THIN PREP: ABNORMAL
CYTOLOGY CVX/VAG DOC THIN PREP: ABNORMAL
DHEA-S SERPL-MCNC: 429 UG/DL
DHEA-S SERPL-MCNC: 526 UG/DL
ESTRADIOL SERPL-MCNC: 343 PG/ML
HCG UR QL: NEGATIVE
HPV 16 E6+E7 MRNA CVX QL NAA+PROBE: NOT DETECTED
HPV HIGH+LOW RISK DNA PNL CVX: DETECTED
HPV HIGH+LOW RISK DNA PNL CVX: DETECTED
HPV18+45 E6+E7 MRNA CVX QL NAA+PROBE: DETECTED
INSULIN SERPL-MCNC: 37.6 UU/ML
LH SERPL-ACNC: 27.5 IU/L
QUALITY CONTROL: YES
SHBG SERPL-SCNC: 24.3 NMOL/L

## 2023-07-07 ENCOUNTER — NON-APPOINTMENT (OUTPATIENT)
Age: 33
End: 2023-07-07

## 2023-07-07 ENCOUNTER — APPOINTMENT (OUTPATIENT)
Dept: OPHTHALMOLOGY | Facility: CLINIC | Age: 33
End: 2023-07-07
Payer: COMMERCIAL

## 2023-07-07 PROCEDURE — 92004 COMPRE OPH EXAM NEW PT 1/>: CPT

## 2023-07-12 ENCOUNTER — NON-APPOINTMENT (OUTPATIENT)
Age: 33
End: 2023-07-12

## 2023-07-12 ENCOUNTER — APPOINTMENT (OUTPATIENT)
Dept: OBGYN | Facility: CLINIC | Age: 33
End: 2023-07-12
Payer: COMMERCIAL

## 2023-07-12 PROCEDURE — 99213 OFFICE O/P EST LOW 20 MIN: CPT | Mod: 95

## 2023-07-13 NOTE — HISTORY OF PRESENT ILLNESS
[Other Location: e.g. School (Enter Location, City,State)___] : at [unfilled], at the time of the visit. [Medical Office: ([facility])___] : at the medical office located in  [Verbal consent obtained from patient] : the patient, [unfilled] [FreeTextEntry1] : Patient presented to discuss results. \par Patient had pap smear in 1/2023 which showed ASCUS-H with initial failed attempt to reach patient by office. Patient's prior pap smear was 2020 and was wnl according to patient. Patient does not believe she received the gardisil vaccine.\par Repeat pap showed AGS and had colpo and endometrial biopsy. Endometrial biopsy was negative. 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 Patient initially expressed hysterectomy for more definitive management but explained that still young and should discuss this with gyn oncology.\par Patient also has had difficulty getting in touch with endocrine. Will provide her further names for referral

## 2023-07-14 ENCOUNTER — APPOINTMENT (OUTPATIENT)
Dept: GYNECOLOGIC ONCOLOGY | Facility: CLINIC | Age: 33
End: 2023-07-14
Payer: COMMERCIAL

## 2023-07-14 DIAGNOSIS — R73.03 PREDIABETES.: ICD-10-CM

## 2023-07-14 DIAGNOSIS — Z87.42 PERSONAL HISTORY OF OTHER DISEASES OF THE FEMALE GENITAL TRACT: ICD-10-CM

## 2023-07-14 DIAGNOSIS — Z86.69 PERSONAL HISTORY OF OTHER DISEASES OF THE NERVOUS SYSTEM AND SENSE ORGANS: ICD-10-CM

## 2023-07-14 PROCEDURE — 57452 EXAM OF CERVIX W/SCOPE: CPT

## 2023-07-14 PROCEDURE — 99205 OFFICE O/P NEW HI 60 MIN: CPT | Mod: 25

## 2023-07-14 RX ORDER — RIMEGEPANT SULFATE 75 MG/75MG
TABLET, ORALLY DISINTEGRATING ORAL
Refills: 0 | Status: ACTIVE | COMMUNITY

## 2023-07-14 RX ORDER — ONABOTULINUMTOXINA 100 [USP'U]/1
INJECTION, POWDER, LYOPHILIZED, FOR SOLUTION INTRADERMAL; INTRAMUSCULAR
Refills: 0 | Status: ACTIVE | COMMUNITY

## 2023-07-14 RX ORDER — ASCORBIC ACID 500 MG
TABLET ORAL
Refills: 0 | Status: ACTIVE | COMMUNITY

## 2023-07-14 RX ORDER — OMEGA-3/DHA/EPA/FISH OIL 300-1000MG
CAPSULE ORAL
Refills: 0 | Status: ACTIVE | COMMUNITY

## 2023-07-14 RX ORDER — SPIRONOLACTONE 50 MG/1
TABLET ORAL
Refills: 0 | Status: ACTIVE | COMMUNITY

## 2023-07-18 LAB — CORE LAB BIOPSY: NORMAL

## 2023-07-26 ENCOUNTER — OUTPATIENT (OUTPATIENT)
Dept: OUTPATIENT SERVICES | Facility: HOSPITAL | Age: 33
LOS: 1 days | End: 2023-07-26

## 2023-07-26 ENCOUNTER — APPOINTMENT (OUTPATIENT)
Dept: OBGYN | Facility: CLINIC | Age: 33
End: 2023-07-26

## 2023-07-26 DIAGNOSIS — D06.9 CARCINOMA IN SITU OF CERVIX, UNSPECIFIED: ICD-10-CM

## 2023-07-28 ENCOUNTER — NON-APPOINTMENT (OUTPATIENT)
Age: 33
End: 2023-07-28

## 2023-07-31 ENCOUNTER — RESULT REVIEW (OUTPATIENT)
Age: 33
End: 2023-07-31

## 2023-08-02 LAB — SURGICAL PATHOLOGY STUDY: SIGNIFICANT CHANGE UP

## 2023-08-14 ENCOUNTER — OUTPATIENT (OUTPATIENT)
Dept: OUTPATIENT SERVICES | Facility: HOSPITAL | Age: 33
LOS: 1 days | End: 2023-08-14
Payer: COMMERCIAL

## 2023-08-14 VITALS
SYSTOLIC BLOOD PRESSURE: 108 MMHG | WEIGHT: 166.01 LBS | HEIGHT: 67 IN | DIASTOLIC BLOOD PRESSURE: 73 MMHG | HEART RATE: 69 BPM | RESPIRATION RATE: 16 BRPM | TEMPERATURE: 98 F | OXYGEN SATURATION: 98 %

## 2023-08-14 DIAGNOSIS — Z01.818 ENCOUNTER FOR OTHER PREPROCEDURAL EXAMINATION: ICD-10-CM

## 2023-08-14 DIAGNOSIS — D06.9 CARCINOMA IN SITU OF CERVIX, UNSPECIFIED: ICD-10-CM

## 2023-08-14 DIAGNOSIS — Z98.890 OTHER SPECIFIED POSTPROCEDURAL STATES: Chronic | ICD-10-CM

## 2023-08-14 LAB
APTT BLD: 32.1 SEC — SIGNIFICANT CHANGE UP (ref 24.5–35.6)
BLD GP AB SCN SERPL QL: SIGNIFICANT CHANGE UP
INR BLD: 1.04 RATIO — SIGNIFICANT CHANGE UP (ref 0.85–1.18)
PROTHROM AB SERPL-ACNC: 11.8 SEC — SIGNIFICANT CHANGE UP (ref 9.5–13)

## 2023-08-14 PROCEDURE — G0463: CPT

## 2023-08-14 NOTE — H&P PST ADULT - NSICDXPASTMEDICALHX_GEN_ALL_CORE_FT
PAST MEDICAL HISTORY:  Migraine      PAST MEDICAL HISTORY:  CIS (carcinoma in situ of cervix)     Migraine

## 2023-08-14 NOTE — H&P PST ADULT - HISTORY OF PRESENT ILLNESS
33 y.o female with PMHx for Migraines and Acne, found to have Carcinoma In Situ of Cervix, found on w/u of  abnormal PAP  Now presents for presurgical evaluation for schedule Examination Under Anesthesia Cold Knife Cone Biopsy and   Endocervical Curettage on 8/17/23 with Dr Ledbetter

## 2023-08-14 NOTE — H&P PST ADULT - PROBLEM SELECTOR PLAN 1
Pt schedule for Examination Under Anesthesia Cold Knife Cone Biopsy and Endocervical Curettage on 8/17/23 with Dr Ledbetter    Labs drawn in PCP   Pt Medical clearance     Pt was  instructed to stop aspirin/ecotrin and all over the counter medication including vitamins and herbal supplements one week prior to surgery   Instructions given on the use of 4% chlorhexidine wash and Pt verbalized understanding of same   Pt Instructed to have nothing by mouth starting midnight day before surgery  Patient is to expect a phone call day before surgery between the hours of 430- 630pm giving arrival time for surgery   Written and verbal preoperative instructions given to patient with understanding verbalized.     Patient today with STOP bang score   Low  risk for DOROTA Pt schedule for Examination Under Anesthesia Cold Knife Cone Biopsy and Endocervical Curettage on 8/17/23 with Dr Ledbetter    Labs drawn by PCP 8/11/23- tracy ramos     Pt was  instructed to stop aspirin/ecotrin and all over the counter medication including vitamins and herbal supplements one week prior to surgery   Instructions given on the use of 4% chlorhexidine wash and Pt verbalized understanding of same   Pt Instructed to have nothing by mouth starting midnight day before surgery  Patient is to expect a phone call day before surgery between the hours of 430- 630pm giving arrival time for surgery   Written and verbal preoperative instructions given to patient with understanding verbalized.     Patient today with STOP bang score 0  Low  risk for DOROTA

## 2023-08-14 NOTE — H&P PST ADULT - REASON FOR ADMISSION
Examination Under Anesthesia Cold Knife Cone Biopsy and Endocervical Curettage on 8/17/23 with Dr Ledbetter

## 2023-08-14 NOTE — H&P PST ADULT - ATTENDING COMMENTS
Patient seen in ASU, no changes reported. Consent form reviewed including examination by learner, all questions answered. Case reviewed with circulating OR team.     Shereen Joshi MD

## 2023-08-14 NOTE — H&P PST ADULT - NSICDXFAMILYHX_GEN_ALL_CORE_FT
FAMILY HISTORY:  Mother  Still living? Yes, Estimated age: Age Unknown  Family history of cervical cancer, Age at diagnosis: Age Unknown

## 2023-08-16 ENCOUNTER — TRANSCRIPTION ENCOUNTER (OUTPATIENT)
Age: 33
End: 2023-08-16

## 2023-08-17 ENCOUNTER — APPOINTMENT (OUTPATIENT)
Dept: GYNECOLOGIC ONCOLOGY | Facility: HOSPITAL | Age: 33
End: 2023-08-17

## 2023-08-17 ENCOUNTER — TRANSCRIPTION ENCOUNTER (OUTPATIENT)
Age: 33
End: 2023-08-17

## 2023-08-17 ENCOUNTER — RESULT REVIEW (OUTPATIENT)
Age: 33
End: 2023-08-17

## 2023-08-17 ENCOUNTER — OUTPATIENT (OUTPATIENT)
Dept: OUTPATIENT SERVICES | Facility: HOSPITAL | Age: 33
LOS: 1 days | End: 2023-08-17
Payer: COMMERCIAL

## 2023-08-17 VITALS
HEIGHT: 67 IN | TEMPERATURE: 98 F | WEIGHT: 166.01 LBS | HEART RATE: 82 BPM | SYSTOLIC BLOOD PRESSURE: 112 MMHG | RESPIRATION RATE: 16 BRPM | DIASTOLIC BLOOD PRESSURE: 75 MMHG | OXYGEN SATURATION: 100 %

## 2023-08-17 VITALS
DIASTOLIC BLOOD PRESSURE: 73 MMHG | HEART RATE: 75 BPM | SYSTOLIC BLOOD PRESSURE: 113 MMHG | OXYGEN SATURATION: 100 % | TEMPERATURE: 98 F | RESPIRATION RATE: 15 BRPM

## 2023-08-17 DIAGNOSIS — Z98.890 OTHER SPECIFIED POSTPROCEDURAL STATES: Chronic | ICD-10-CM

## 2023-08-17 DIAGNOSIS — D06.9 CARCINOMA IN SITU OF CERVIX, UNSPECIFIED: ICD-10-CM

## 2023-08-17 LAB
BLD GP AB SCN SERPL QL: SIGNIFICANT CHANGE UP
HCG UR QL: NEGATIVE — SIGNIFICANT CHANGE UP

## 2023-08-17 PROCEDURE — 57520 CONIZATION OF CERVIX: CPT

## 2023-08-17 PROCEDURE — 81025 URINE PREGNANCY TEST: CPT

## 2023-08-17 PROCEDURE — 88305 TISSUE EXAM BY PATHOLOGIST: CPT | Mod: 26

## 2023-08-17 PROCEDURE — C1889: CPT

## 2023-08-17 PROCEDURE — 36415 COLL VENOUS BLD VENIPUNCTURE: CPT

## 2023-08-17 PROCEDURE — 86900 BLOOD TYPING SEROLOGIC ABO: CPT

## 2023-08-17 PROCEDURE — 86901 BLOOD TYPING SEROLOGIC RH(D): CPT

## 2023-08-17 PROCEDURE — 88307 TISSUE EXAM BY PATHOLOGIST: CPT | Mod: 26

## 2023-08-17 PROCEDURE — 86850 RBC ANTIBODY SCREEN: CPT

## 2023-08-17 PROCEDURE — 57100 BIOPSY VAGINAL MUCOSA SIMPLE: CPT | Mod: XS

## 2023-08-17 PROCEDURE — 88305 TISSUE EXAM BY PATHOLOGIST: CPT

## 2023-08-17 PROCEDURE — 88307 TISSUE EXAM BY PATHOLOGIST: CPT

## 2023-08-17 DEVICE — SURGICEL 2 X 14": Type: IMPLANTABLE DEVICE | Status: FUNCTIONAL

## 2023-08-17 RX ORDER — FENTANYL CITRATE 50 UG/ML
25 INJECTION INTRAVENOUS
Refills: 0 | Status: DISCONTINUED | OUTPATIENT
Start: 2023-08-17 | End: 2023-08-17

## 2023-08-17 RX ORDER — SODIUM CHLORIDE 9 MG/ML
3 INJECTION INTRAMUSCULAR; INTRAVENOUS; SUBCUTANEOUS EVERY 8 HOURS
Refills: 0 | Status: DISCONTINUED | OUTPATIENT
Start: 2023-08-17 | End: 2023-08-17

## 2023-08-17 RX ORDER — ONDANSETRON 8 MG/1
4 TABLET, FILM COATED ORAL ONCE
Refills: 0 | Status: DISCONTINUED | OUTPATIENT
Start: 2023-08-17 | End: 2023-08-17

## 2023-08-17 RX ORDER — FENTANYL CITRATE 50 UG/ML
50 INJECTION INTRAVENOUS
Refills: 0 | Status: DISCONTINUED | OUTPATIENT
Start: 2023-08-17 | End: 2023-08-17

## 2023-08-17 RX ADMIN — SODIUM CHLORIDE 3 MILLILITER(S): 9 INJECTION INTRAMUSCULAR; INTRAVENOUS; SUBCUTANEOUS at 07:24

## 2023-08-17 RX ADMIN — FENTANYL CITRATE 50 MICROGRAM(S): 50 INJECTION INTRAVENOUS at 11:00

## 2023-08-17 RX ADMIN — FENTANYL CITRATE 25 MICROGRAM(S): 50 INJECTION INTRAVENOUS at 11:30

## 2023-08-17 RX ADMIN — FENTANYL CITRATE 25 MICROGRAM(S): 50 INJECTION INTRAVENOUS at 11:16

## 2023-08-17 RX ADMIN — FENTANYL CITRATE 50 MICROGRAM(S): 50 INJECTION INTRAVENOUS at 11:14

## 2023-08-17 NOTE — BRIEF OPERATIVE NOTE - OPERATION/FINDINGS
INCOMPLETE    Left upper vaginal wall lesion acetowhite change raised  acetowhite changes throughout ectocervix including from 10-1 and 5-7  excellent hemostasis noted Exam under anesthesia revealed parous cervix with mucin in the external os. Raised lesion on the left upper vaginal wall with acetowhite changes noted after application of acetic acid. No other lesions noted on the vaginal walls surrounding the cervix. Cervix with no discrete lesions or masses, acetowhite changes noted on the ectocervix with clear area from 10-1 o'clock and another area from 5-7 o'clock on the anterior and posterior areas of the ectocervix. Transformation zone visible. Excellent hemostasis noted after CKC biopsy.

## 2023-08-17 NOTE — ASU DISCHARGE PLAN (ADULT/PEDIATRIC) - PROVIDER TOKENS
FREE:[LAST:[Adi],FIRST:[Shereen],PHONE:[phone]],FAX:[(   )    -],ADDRESS:[[address] [place], [place]],FOLLOWUP:[2 weeks]]

## 2023-08-17 NOTE — ASU PATIENT PROFILE, ADULT - HISTORY OF COVID-19 VACCINATION
Name: Anjana Winslow  (male)  11 days old, CGA 36w6d  Birth: 2018 at 5:05 PM    Gestational Age: 35w2d, 5 lb 14.7 oz (2685 g)  2020     PTL and PROM   x1 BMZ     Weight change: 0.015 kg (0.5 oz)  Last 3 weights:  Vitals:    02/06/20 1600 02/07/20 1500 02/08/20 1635   Weight: 2.645 kg (5 lb 13.3 oz) 2.7 kg (5 lb 15.2 oz) 2.715 kg (5 lb 15.8 oz)     Vital signs (past 24 hours)   Temp:  [98  F (36.7  C)-98.4  F (36.9  C)] 98  F (36.7  C)  Heart Rate:  [134-197] 189  Resp:  [37-62] 40  BP: (83-84)/(53-64) 84/64  SpO2:  [97 %-100 %] 98 %    Intake:  265 +   Output: x8   Stool: x 7   Em/asp:    ml/kg/day: 98 +   goal ml/kg: ALD   kcal/kg/day: 61 +   ml/kg/hr UOP:               Lines/Tubes: discontinue PICC 2/8    Diet: MBM/DBM ALD  BF ALD, may take more than minimum  PO% 100        LABS/RESULTS/MEDS PLAN   FEN:       cholecalciferol (D-VI-SOL, Vitamin D3) 10 MCG/ML (400 units/ml) liquid 400 Units                                         Creat 0.36                                X PVS with Fe     Resp: RA  no events                                                                                              CV:                                              ID: Date Cultures/Labs Treatment (# of days)   1/29 Blood Cx (+@12hrs E. Coli)      1/30 Blood cx    neg  LP cx  neg A (off 2/1)      Current Facility-Administered Medications   Medication     gentamicin (PF) (GARAMYCIN) injection NICU 10 mg every 24 hours     LP gram stain: No organisms, rare WBC's seen,Few   Red blood cells seen     CRP <2.9  (<2.9)  Planning a 10 day treatment course from the first negative blood culture on 1/30, ending Sat, 2/8. 1900    IP pharm consult for gent levels and dosing 2/4, dosing revised per their recommendation       Heme:  Mom B+  Baby B+                                           CBC RESULTS:   Recent Labs   Lab Test 02/01/20  0530   WBC 7.0*   RBC 4.58   HGB 16.2   HCT 44.9          GI/  Jaundice:   Recent Labs   Lab  02/06/20  0630 02/05/20  0420 02/03/20  0400 02/02/20  0400   BILITOTAL 12.8* 13.9* 10.3 9.8   Glycerin PRN  resolved   Neuro: Deep sacral dimple [x] 2/7 Spinal US normal   Endo: NMS: 1. 1/31 WNL         Parent update Parents updated during rounds.     Exam: Gen:  Calm, responsive to exam.  HEENT:  Anterior fontanel soft, sutures approximating  Resp:  Clear equal breath sounds, non labored effort  CV:  RRR, no murmur appreciated, normal pulses/cap refill <2sec  GI: round, soft, audible bowel sounds  Skin: Pink/mild jaundice, PICC right saphenous   Neuro: Tone AGA. Deep sacral dimple noted on exam    ROP/  HCM: Most Recent Immunizations   Administered Date(s) Administered     Hep B, Peds or Adolescent 2020     CCHD passed 1/31    CST pass__     Hearing pass_   PCP: Dr. [facility]  [x]Will need hearing screen on Sunday 9th before discharge                      Yes

## 2023-08-17 NOTE — ASU DISCHARGE PLAN (ADULT/PEDIATRIC) - NS MD DC FALL RISK RISK
For information on Fall & Injury Prevention, visit: [email or web address] OR  [email or web address] OR  https://www.cdc.gov/steadi/patient.html

## 2023-08-17 NOTE — ASU PATIENT PROFILE, ADULT - PRO ARRIVE FROM
I have personally seen and evaluated the device findings. Interrogation reviewed and I agree with assessment.     Chucky Valero home

## 2023-08-17 NOTE — ASU PATIENT PROFILE, ADULT - FALL HARM RISK - UNIVERSAL INTERVENTIONS
Bed in lowest position, wheels locked, appropriate side rails in place/Call bell, personal items and telephone in reach/Instruct patient to call for assistance before getting out of bed or chair/Non-slip footwear when patient is out of bed/[place] to call system/Physically safe environment - no spills, clutter or unnecessary equipment/Purposeful Proactive Rounding/Room/bathroom lighting operational, light cord in reach

## 2023-08-17 NOTE — BRIEF OPERATIVE NOTE - NSICDXBRIEFPROCEDURE_GEN_ALL_CORE_FT
PROCEDURES:  Cone biopsy, cervix, using cold knife, with endocervical curettage 17-Aug-2023 10:46:54  Kamala Millan  Excision, mass, vaginal wall 17-Aug-2023 10:47:53  Kamala Millan

## 2023-08-17 NOTE — ASU DISCHARGE PLAN (ADULT/PEDIATRIC) - ASU DC SPECIAL INSTRUCTIONSFT
Regular diet as tolerated, regular activity as tolerated, no heavy lifting for first two weeks.  Nothing per vagina: no intercourse, tampons or douching.  Call your provider if you experience fevers, chills, worsening abdominal pain, inability to urinate or worsening vaginal bleeding.  Follow up with Dr. Blanco in 2 weeks.

## 2023-08-17 NOTE — ASU DISCHARGE PLAN (ADULT/PEDIATRIC) - CARE PROVIDER_API CALL
Shereen Joshi  [address] [place], [place]  Phone: [phone]  Fax: (   )    -  Follow Up Time: 2 weeks

## 2023-08-17 NOTE — ASU PREOP CHECKLIST - SELECT TESTS ORDERED
BMP/CBC/PT/PTT/INR/Type and Cross/Type and Screen/UCG/EKG BMP/CBC/PT/PTT/INR/Type and Cross/Type and Screen/UCG

## 2023-08-21 ENCOUNTER — NON-APPOINTMENT (OUTPATIENT)
Age: 33
End: 2023-08-21

## 2023-08-21 LAB — SURGICAL PATHOLOGY STUDY: SIGNIFICANT CHANGE UP

## 2023-08-22 PROBLEM — D06.9 CARCINOMA IN SITU OF CERVIX, UNSPECIFIED: Chronic | Status: ACTIVE | Noted: 2023-08-14

## 2023-08-25 ENCOUNTER — APPOINTMENT (OUTPATIENT)
Dept: OBGYN | Facility: CLINIC | Age: 33
End: 2023-08-25
Payer: COMMERCIAL

## 2023-08-25 PROCEDURE — 90471 IMMUNIZATION ADMIN: CPT

## 2023-08-25 PROCEDURE — 90651 9VHPV VACCINE 2/3 DOSE IM: CPT

## 2023-08-28 ENCOUNTER — NON-APPOINTMENT (OUTPATIENT)
Age: 33
End: 2023-08-28

## 2023-08-30 ENCOUNTER — APPOINTMENT (OUTPATIENT)
Dept: GYNECOLOGIC ONCOLOGY | Facility: CLINIC | Age: 33
End: 2023-08-30
Payer: COMMERCIAL

## 2023-08-30 VITALS
BODY MASS INDEX: 26.2 KG/M2 | WEIGHT: 163 LBS | SYSTOLIC BLOOD PRESSURE: 113 MMHG | HEIGHT: 66 IN | DIASTOLIC BLOOD PRESSURE: 74 MMHG | HEART RATE: 74 BPM | TEMPERATURE: 98 F

## 2023-08-30 PROCEDURE — 99024 POSTOP FOLLOW-UP VISIT: CPT

## 2023-08-30 NOTE — DISCUSSION/SUMMARY
[Doing Well] : is doing well [Excellent Pain Control] : has excellent pain control [Firm] : soft [Tender] : nontender [Abnormal Bowel Sounds] : normal bowel sounds [Rebound] : no rebound tenderness [Guarding] : no guarding [de-identified] : well healing  cone biopsy site.  Surgicel in place; stitch in place.

## 2023-08-30 NOTE — REASON FOR VISIT
[Post Op] : post op visit [de-identified] : 8/17/2023 [de-identified] : Examination under anesthesia, cervical conization, dilation and curettage, endocervical curettage, EMC,  vaginal biopsies [de-identified] : Patient called the office with foul-smelling discharge.  She also had an recent heavy vaginal bleeding/heavy menses. Denies f/c/n/v/d/Denies changes to bowel or bladder habits; denies unexplained weight loss or gain.  Denies abdominal pain, pressure, bloating or any other associated symptoms.   She is scheduled to see Dr. Blanco on September 15 for her usual postop appointment which she will keep.  Final path: 1.  Left upper vaginal biopsy: -   Fibromuscular tissue with overlying ulcer bed (granulation tissue with inflammatory exudate); epithelial cells are not identified  2.  Left upper vaginal lesion; biopsy: -   Fibromuscular tissue with overlying ulcerated and inflamed squamous mucosa  3.  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  Endocervical curettings: -   Endocervical tissue with reactive epithelial atypia  5.  Endometrial curettings: -   Chronic endometritis with proliferative changes

## 2023-08-30 NOTE — ASSESSMENT
[FreeTextEntry1] :   -  BV panel today s/p cone biopsy.  Per patient, she has foul smelling discharge.   -  Cone biopsy site does not look infected.  Surgicel is still sutured to cervix.  Removed as much product as possible.   -  Applied monsels for any bleeding caused by surgicel removal. -  Pt also has her period at this time -  Reviewed path with patient and provided a copy.  -  She will follow up with Dr. Blanco as scheduled on 9/15/23 to review and discuss next steps in future follow up.   -  Will call patient with BV results. - Pt verbalized understanding of plan and agrees. -  All questions answered. -  She will call with any further questions or concerns.

## 2023-08-31 LAB
CANDIDA VAG CYTO: NOT DETECTED
G VAGINALIS+PREV SP MTYP VAG QL MICRO: DETECTED
T VAGINALIS VAG QL WET PREP: NOT DETECTED

## 2023-08-31 RX ORDER — METRONIDAZOLE 500 MG/1
500 TABLET ORAL
Qty: 14 | Refills: 0 | Status: ACTIVE | COMMUNITY
Start: 2023-08-31 | End: 1900-01-01

## 2023-09-15 ENCOUNTER — APPOINTMENT (OUTPATIENT)
Dept: GYNECOLOGIC ONCOLOGY | Facility: CLINIC | Age: 33
End: 2023-09-15
Payer: COMMERCIAL

## 2023-09-15 VITALS
TEMPERATURE: 98.2 F | HEART RATE: 82 BPM | DIASTOLIC BLOOD PRESSURE: 75 MMHG | SYSTOLIC BLOOD PRESSURE: 120 MMHG | HEIGHT: 66 IN | OXYGEN SATURATION: 99 % | RESPIRATION RATE: 14 BRPM

## 2023-09-15 PROCEDURE — 99214 OFFICE O/P EST MOD 30 MIN: CPT | Mod: 24

## 2023-09-15 RX ORDER — METRONIDAZOLE 500 MG/1
500 TABLET ORAL
Qty: 28 | Refills: 0 | Status: ACTIVE | COMMUNITY
Start: 2023-09-15 | End: 1900-01-01

## 2023-09-15 RX ORDER — CIPROFLOXACIN HYDROCHLORIDE 250 MG/1
250 TABLET, FILM COATED ORAL
Qty: 14 | Refills: 0 | Status: ACTIVE | COMMUNITY
Start: 2023-09-15 | End: 1900-01-01

## 2023-10-15 PROBLEM — D06.9 ADENOCARCINOMA IN SITU (AIS) OF UTERINE CERVIX: Status: ACTIVE | Noted: 2023-07-13

## 2023-10-17 ENCOUNTER — APPOINTMENT (OUTPATIENT)
Dept: GYNECOLOGIC ONCOLOGY | Facility: CLINIC | Age: 33
End: 2023-10-17
Payer: COMMERCIAL

## 2023-10-17 VITALS
WEIGHT: 161 LBS | HEART RATE: 74 BPM | DIASTOLIC BLOOD PRESSURE: 75 MMHG | HEIGHT: 66 IN | BODY MASS INDEX: 25.88 KG/M2 | SYSTOLIC BLOOD PRESSURE: 107 MMHG

## 2023-10-17 DIAGNOSIS — D06.9 CARCINOMA IN SITU OF CERVIX, UNSPECIFIED: ICD-10-CM

## 2023-10-17 PROCEDURE — 99024 POSTOP FOLLOW-UP VISIT: CPT

## 2023-10-20 LAB
CANDIDA VAG CYTO: DETECTED
G VAGINALIS+PREV SP MTYP VAG QL MICRO: DETECTED
T VAGINALIS VAG QL WET PREP: NOT DETECTED

## 2023-10-20 RX ORDER — FLUCONAZOLE 150 MG/1
150 TABLET ORAL
Qty: 1 | Refills: 0 | Status: ACTIVE | COMMUNITY
Start: 2023-10-20 | End: 1900-01-01

## 2023-10-20 RX ORDER — METRONIDAZOLE 500 MG/1
500 TABLET ORAL
Qty: 14 | Refills: 0 | Status: ACTIVE | COMMUNITY
Start: 2023-10-20 | End: 1900-01-01

## 2023-10-25 ENCOUNTER — APPOINTMENT (OUTPATIENT)
Dept: OBGYN | Facility: CLINIC | Age: 33
End: 2023-10-25

## 2023-12-19 RX ORDER — NEOMYCIN SULFATE 500 MG/1
500 TABLET ORAL
Qty: 2 | Refills: 0 | Status: ACTIVE | COMMUNITY
Start: 1900-01-01 | End: 1900-01-01

## 2023-12-19 RX ORDER — METRONIDAZOLE 500 MG/1
500 TABLET ORAL
Qty: 2 | Refills: 0 | Status: ACTIVE | COMMUNITY
Start: 1900-01-01 | End: 1900-01-01

## 2023-12-21 ENCOUNTER — EMERGENCY (EMERGENCY)
Facility: HOSPITAL | Age: 33
LOS: 1 days | Discharge: HOME | End: 2023-12-21
Attending: EMERGENCY MEDICINE
Payer: COMMERCIAL

## 2023-12-21 VITALS
HEART RATE: 86 BPM | HEIGHT: 67 IN | WEIGHT: 164.91 LBS | OXYGEN SATURATION: 100 % | RESPIRATION RATE: 20 BRPM | DIASTOLIC BLOOD PRESSURE: 87 MMHG | SYSTOLIC BLOOD PRESSURE: 123 MMHG | TEMPERATURE: 98 F

## 2023-12-21 DIAGNOSIS — Z98.890 OTHER SPECIFIED POSTPROCEDURAL STATES: Chronic | ICD-10-CM

## 2023-12-21 LAB
ALBUMIN SERPL ELPH-MCNC: 4.6 G/DL — SIGNIFICANT CHANGE UP (ref 3.3–5)
ALBUMIN SERPL ELPH-MCNC: 4.6 G/DL — SIGNIFICANT CHANGE UP (ref 3.3–5)
ALP SERPL-CCNC: 82 U/L — SIGNIFICANT CHANGE UP (ref 40–120)
ALP SERPL-CCNC: 82 U/L — SIGNIFICANT CHANGE UP (ref 40–120)
ALT FLD-CCNC: 13 U/L — SIGNIFICANT CHANGE UP (ref 10–45)
ALT FLD-CCNC: 13 U/L — SIGNIFICANT CHANGE UP (ref 10–45)
ANION GAP SERPL CALC-SCNC: 11 MMOL/L — SIGNIFICANT CHANGE UP (ref 5–17)
ANION GAP SERPL CALC-SCNC: 11 MMOL/L — SIGNIFICANT CHANGE UP (ref 5–17)
AST SERPL-CCNC: 20 U/L — SIGNIFICANT CHANGE UP (ref 10–40)
AST SERPL-CCNC: 20 U/L — SIGNIFICANT CHANGE UP (ref 10–40)
BASOPHILS # BLD AUTO: 0.02 K/UL — SIGNIFICANT CHANGE UP (ref 0–0.2)
BASOPHILS # BLD AUTO: 0.02 K/UL — SIGNIFICANT CHANGE UP (ref 0–0.2)
BASOPHILS NFR BLD AUTO: 0.1 % — SIGNIFICANT CHANGE UP (ref 0–2)
BASOPHILS NFR BLD AUTO: 0.1 % — SIGNIFICANT CHANGE UP (ref 0–2)
BILIRUB SERPL-MCNC: 0.4 MG/DL — SIGNIFICANT CHANGE UP (ref 0.2–1.2)
BILIRUB SERPL-MCNC: 0.4 MG/DL — SIGNIFICANT CHANGE UP (ref 0.2–1.2)
BUN SERPL-MCNC: 11 MG/DL — SIGNIFICANT CHANGE UP (ref 7–23)
BUN SERPL-MCNC: 11 MG/DL — SIGNIFICANT CHANGE UP (ref 7–23)
CALCIUM SERPL-MCNC: 9.1 MG/DL — SIGNIFICANT CHANGE UP (ref 8.4–10.5)
CALCIUM SERPL-MCNC: 9.1 MG/DL — SIGNIFICANT CHANGE UP (ref 8.4–10.5)
CHLORIDE SERPL-SCNC: 101 MMOL/L — SIGNIFICANT CHANGE UP (ref 96–108)
CHLORIDE SERPL-SCNC: 101 MMOL/L — SIGNIFICANT CHANGE UP (ref 96–108)
CO2 SERPL-SCNC: 22 MMOL/L — SIGNIFICANT CHANGE UP (ref 22–31)
CO2 SERPL-SCNC: 22 MMOL/L — SIGNIFICANT CHANGE UP (ref 22–31)
CREAT SERPL-MCNC: 0.57 MG/DL — SIGNIFICANT CHANGE UP (ref 0.5–1.3)
CREAT SERPL-MCNC: 0.57 MG/DL — SIGNIFICANT CHANGE UP (ref 0.5–1.3)
EGFR: 125 ML/MIN/1.73M2 — SIGNIFICANT CHANGE UP
EGFR: 125 ML/MIN/1.73M2 — SIGNIFICANT CHANGE UP
EOSINOPHIL # BLD AUTO: 0.01 K/UL — SIGNIFICANT CHANGE UP (ref 0–0.5)
EOSINOPHIL # BLD AUTO: 0.01 K/UL — SIGNIFICANT CHANGE UP (ref 0–0.5)
EOSINOPHIL NFR BLD AUTO: 0.1 % — SIGNIFICANT CHANGE UP (ref 0–6)
EOSINOPHIL NFR BLD AUTO: 0.1 % — SIGNIFICANT CHANGE UP (ref 0–6)
GLUCOSE SERPL-MCNC: 85 MG/DL — SIGNIFICANT CHANGE UP (ref 70–99)
GLUCOSE SERPL-MCNC: 85 MG/DL — SIGNIFICANT CHANGE UP (ref 70–99)
HCT VFR BLD CALC: 36.3 % — SIGNIFICANT CHANGE UP (ref 34.5–45)
HCT VFR BLD CALC: 36.3 % — SIGNIFICANT CHANGE UP (ref 34.5–45)
HGB BLD-MCNC: 11.4 G/DL — LOW (ref 11.5–15.5)
HGB BLD-MCNC: 11.4 G/DL — LOW (ref 11.5–15.5)
IMM GRANULOCYTES NFR BLD AUTO: 0.6 % — SIGNIFICANT CHANGE UP (ref 0–0.9)
IMM GRANULOCYTES NFR BLD AUTO: 0.6 % — SIGNIFICANT CHANGE UP (ref 0–0.9)
LYMPHOCYTES # BLD AUTO: 1.47 K/UL — SIGNIFICANT CHANGE UP (ref 1–3.3)
LYMPHOCYTES # BLD AUTO: 1.47 K/UL — SIGNIFICANT CHANGE UP (ref 1–3.3)
LYMPHOCYTES # BLD AUTO: 10.7 % — LOW (ref 13–44)
LYMPHOCYTES # BLD AUTO: 10.7 % — LOW (ref 13–44)
MCHC RBC-ENTMCNC: 27.7 PG — SIGNIFICANT CHANGE UP (ref 27–34)
MCHC RBC-ENTMCNC: 27.7 PG — SIGNIFICANT CHANGE UP (ref 27–34)
MCHC RBC-ENTMCNC: 31.4 GM/DL — LOW (ref 32–36)
MCHC RBC-ENTMCNC: 31.4 GM/DL — LOW (ref 32–36)
MCV RBC AUTO: 88.1 FL — SIGNIFICANT CHANGE UP (ref 80–100)
MCV RBC AUTO: 88.1 FL — SIGNIFICANT CHANGE UP (ref 80–100)
MONOCYTES # BLD AUTO: 0.46 K/UL — SIGNIFICANT CHANGE UP (ref 0–0.9)
MONOCYTES # BLD AUTO: 0.46 K/UL — SIGNIFICANT CHANGE UP (ref 0–0.9)
MONOCYTES NFR BLD AUTO: 3.3 % — SIGNIFICANT CHANGE UP (ref 2–14)
MONOCYTES NFR BLD AUTO: 3.3 % — SIGNIFICANT CHANGE UP (ref 2–14)
NEUTROPHILS # BLD AUTO: 11.73 K/UL — HIGH (ref 1.8–7.4)
NEUTROPHILS # BLD AUTO: 11.73 K/UL — HIGH (ref 1.8–7.4)
NEUTROPHILS NFR BLD AUTO: 85.2 % — HIGH (ref 43–77)
NEUTROPHILS NFR BLD AUTO: 85.2 % — HIGH (ref 43–77)
NRBC # BLD: 0 /100 WBCS — SIGNIFICANT CHANGE UP (ref 0–0)
NRBC # BLD: 0 /100 WBCS — SIGNIFICANT CHANGE UP (ref 0–0)
PLATELET # BLD AUTO: 337 K/UL — SIGNIFICANT CHANGE UP (ref 150–400)
PLATELET # BLD AUTO: 337 K/UL — SIGNIFICANT CHANGE UP (ref 150–400)
POTASSIUM SERPL-MCNC: 4.5 MMOL/L — SIGNIFICANT CHANGE UP (ref 3.5–5.3)
POTASSIUM SERPL-MCNC: 4.5 MMOL/L — SIGNIFICANT CHANGE UP (ref 3.5–5.3)
POTASSIUM SERPL-SCNC: 4.5 MMOL/L — SIGNIFICANT CHANGE UP (ref 3.5–5.3)
POTASSIUM SERPL-SCNC: 4.5 MMOL/L — SIGNIFICANT CHANGE UP (ref 3.5–5.3)
PROT SERPL-MCNC: 8.1 G/DL — SIGNIFICANT CHANGE UP (ref 6–8.3)
PROT SERPL-MCNC: 8.1 G/DL — SIGNIFICANT CHANGE UP (ref 6–8.3)
RBC # BLD: 4.12 M/UL — SIGNIFICANT CHANGE UP (ref 3.8–5.2)
RBC # BLD: 4.12 M/UL — SIGNIFICANT CHANGE UP (ref 3.8–5.2)
RBC # FLD: 13.6 % — SIGNIFICANT CHANGE UP (ref 10.3–14.5)
RBC # FLD: 13.6 % — SIGNIFICANT CHANGE UP (ref 10.3–14.5)
SODIUM SERPL-SCNC: 134 MMOL/L — LOW (ref 135–145)
SODIUM SERPL-SCNC: 134 MMOL/L — LOW (ref 135–145)
WBC # BLD: 13.77 K/UL — HIGH (ref 3.8–10.5)
WBC # BLD: 13.77 K/UL — HIGH (ref 3.8–10.5)
WBC # FLD AUTO: 13.77 K/UL — HIGH (ref 3.8–10.5)
WBC # FLD AUTO: 13.77 K/UL — HIGH (ref 3.8–10.5)

## 2023-12-21 PROCEDURE — 93975 VASCULAR STUDY: CPT | Mod: 26

## 2023-12-21 PROCEDURE — 80053 COMPREHEN METABOLIC PANEL: CPT

## 2023-12-21 PROCEDURE — 76830 TRANSVAGINAL US NON-OB: CPT | Mod: 26

## 2023-12-21 PROCEDURE — 76830 TRANSVAGINAL US NON-OB: CPT

## 2023-12-21 PROCEDURE — 85025 COMPLETE CBC W/AUTO DIFF WBC: CPT

## 2023-12-21 PROCEDURE — 99284 EMERGENCY DEPT VISIT MOD MDM: CPT | Mod: 25

## 2023-12-21 PROCEDURE — 96375 TX/PRO/DX INJ NEW DRUG ADDON: CPT

## 2023-12-21 PROCEDURE — 93975 VASCULAR STUDY: CPT

## 2023-12-21 PROCEDURE — 99285 EMERGENCY DEPT VISIT HI MDM: CPT

## 2023-12-21 PROCEDURE — 96374 THER/PROPH/DIAG INJ IV PUSH: CPT

## 2023-12-21 PROCEDURE — 84702 CHORIONIC GONADOTROPIN TEST: CPT

## 2023-12-21 RX ORDER — ACETAMINOPHEN 500 MG
1000 TABLET ORAL ONCE
Refills: 0 | Status: COMPLETED | OUTPATIENT
Start: 2023-12-21 | End: 2023-12-21

## 2023-12-21 RX ORDER — SODIUM CHLORIDE 9 MG/ML
1000 INJECTION INTRAMUSCULAR; INTRAVENOUS; SUBCUTANEOUS ONCE
Refills: 0 | Status: COMPLETED | OUTPATIENT
Start: 2023-12-21 | End: 2023-12-21

## 2023-12-21 RX ORDER — DIPHENHYDRAMINE HCL 50 MG
25 CAPSULE ORAL ONCE
Refills: 0 | Status: COMPLETED | OUTPATIENT
Start: 2023-12-21 | End: 2023-12-21

## 2023-12-21 RX ORDER — METOCLOPRAMIDE HCL 10 MG
10 TABLET ORAL ONCE
Refills: 0 | Status: COMPLETED | OUTPATIENT
Start: 2023-12-21 | End: 2023-12-21

## 2023-12-21 RX ORDER — KETOROLAC TROMETHAMINE 30 MG/ML
15 SYRINGE (ML) INJECTION ONCE
Refills: 0 | Status: DISCONTINUED | OUTPATIENT
Start: 2023-12-21 | End: 2023-12-21

## 2023-12-21 RX ADMIN — Medication 10 MILLIGRAM(S): at 14:31

## 2023-12-21 RX ADMIN — Medication 25 MILLIGRAM(S): at 14:27

## 2023-12-21 RX ADMIN — Medication 15 MILLIGRAM(S): at 10:27

## 2023-12-21 RX ADMIN — SODIUM CHLORIDE 1000 MILLILITER(S): 9 INJECTION INTRAMUSCULAR; INTRAVENOUS; SUBCUTANEOUS at 10:25

## 2023-12-21 RX ADMIN — Medication 400 MILLIGRAM(S): at 14:31

## 2023-12-21 NOTE — ED PROVIDER NOTE - ATTENDING CONTRIBUTION TO CARE
I performed a history and physical exam of the patient and discussed their management with the resident. I reviewed the resident's note and agree with the documented findings and plan of care.  Darby Garcia MD

## 2023-12-21 NOTE — ED PROVIDER NOTE - OBJECTIVE STATEMENT
33-year-old female PMH cervical cancer and migraine presenting with lower abdominal pain on menses  Patient states had a cone biopsy 3 months ago after biopsy has been having persistent abdominal pain during 1 (the abdominal pain has been intermittently getting worse month by month.  The patient states her menstrual flow is normal and she typically bleeds alot.  Patient states she had 1 bout of emesis this morning and has back pain but is typical back pain she has during her menses.  The patient denies chest pain shortness of breath nausea vomiting vision changes headache numbness tingling dysuria bleeding in the urine hematochezia melena.    This patient is scheduled for hysterectomy on the 28th with Dr. Treva Blanco.  The patient's OB/GYN is Dr. Gallegos.

## 2023-12-21 NOTE — ED PROVIDER NOTE - PATIENT PORTAL LINK FT
You can access the FollowMyHealth Patient Portal offered by [facility] by registering at the following website: [email or web address]. By joining [employer]’s FollowMyHealth portal, you will also be able to view your health information using other applications (apps) compatible with our system. You can access the FollowMyHealth Patient Portal offered by [facility] by registering at the following website: [email or web address]. By joining [facility]’s FollowMyHealth portal, you will also be able to view your health information using other applications (apps) compatible with our system.

## 2023-12-21 NOTE — ED ADULT NURSE NOTE - OBJECTIVE STATEMENT
Patient is a 33y female presenting to the ED ambulatory from home with abdominal pain. Patient A&Ox4. Patient is speaking coherently in full sentences. Reports having lower abdominal pain with vaginal bleeding this morning, states she started her menstrual cycle today. Reports having similar pain with her menstrual cycle every month but today is worse. Denies heavy vaginal bleeding or soaking through pads. Reports having "cancerous cells found in her cervix" and is scheduled for a hysterectomy next week. Respirations spontaneous, even, and non-labored. Abdomen soft, non-distended and lower abdomen is tender to palpation.

## 2023-12-21 NOTE — ED ADULT NURSE NOTE - NSFALLUNIVINTERV_ED_ALL_ED
Bed/Stretcher in lowest position, wheels locked, appropriate side rails in place/Call bell, personal items and telephone in reach/Instruct patient to call for assistance before getting out of bed/chair/stretcher/Non-slip footwear applied when patient is off stretcher/[place] to call system/Physically safe environment - no spills, clutter or unnecessary equipment/Purposeful proactive rounding/Room/bathroom lighting operational, light cord in reach Bed/Stretcher in lowest position, wheels locked, appropriate side rails in place/Call bell, personal items and telephone in reach/Instruct patient to call for assistance before getting out of bed/chair/stretcher/Non-slip footwear applied when patient is off stretcher/[place] to call system/Physically safe environment - no spills, clutter or unnecessary equipment/Purposeful proactive rounding/Room/bathroom lighting operational, light cord in reach

## 2023-12-21 NOTE — CONSULT NOTE ADULT - SUBJECTIVE AND OBJECTIVE BOX
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      Name of GYN Physician: Dr. Jimenez  Name of GYN Onc Physician: Dr. 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  PSH: Cold knife cone biopsy, vaginal biopsy, Liposuction, Abdominoplasty  Meds: Nortec, spironolactone, Vit C, magnesium  Allx: NKDA  Social History: Denies smoking use, drug use, alcohol use    Vital Signs Last 24 Hrs  T(C): 36.7 (21 Dec 2023 09:12), Max: 36.7 (21 Dec 2023 09:12)  T(F): 98.1 (21 Dec 2023 09:12), Max: 98.1 (21 Dec 2023 09:12)  HR: 86 (21 Dec 2023 09:12) (86 - 86)  BP: 123/87 (21 Dec 2023 09:12) (123/87 - 123/87)  RR: 20 (21 Dec 2023 09:12) (20 - 20)  SpO2: 100% (21 Dec 2023 09:12) (100% - 100%)    Parameters below as of 21 Dec 2023 09:12  Patient On (Oxygen Delivery Method): room air    Physical Exam:   General: sitting comfortably in bed, NAD   HEENT: neck supple, full ROM  CV: extremities well perfused  Lungs: normal respiratory effort on room air  Back: No CVA tenderness  Abd: Soft, non-tender, non-distended  :  No bleeding on pad.  External labia wnl. Bimanual exam with no cervical motion tenderness, uterus wnl, adnexa non palpable b/l.  Cervix closed vs. Cervix dilated  Speculum Exam: No active bleeding from os.  Ext: non-tender b/l, no edema     LABS:                        11.4   13.77 )-----------( 337      ( 21 Dec 2023 11:04 )             36.3     12-    134<L>  |  101  |  11  ----------------------------<  85  4.5   |  22  |  0.57    Ca    9.1      21 Dec 2023 10:47    TPro  8.1  /  Alb  4.6  /  TBili  0.4  /  DBili  x   /  AST  20  /  ALT  13  /  AlkPhos  82  12-21    I&O's Detail    Urinalysis Basic - ( 21 Dec 2023 10:47 )    Color: x / Appearance: x / SG: x / pH: x  Gluc: 85 mg/dL / Ketone: x  / Bili: x / Urobili: x   Blood: x / Protein: x / Nitrite: x   Leuk Esterase: x / RBC: x / WBC x   Sq Epi: x / Non Sq Epi: x / Bacteria: x    RADIOLOGY & ADDITIONAL STUDIES:     ABIMAEL AARON  33y  Female [id number]    HPI: Patient is a 32yo  well known to [facility] GYN Oncology service for 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      Name of GYN Physician: Dr. Jimenez  Name of GYN Onc Physician: Dr. 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  PSH: Cold knife cone biopsy, vaginal biopsy, Liposuction, Abdominoplasty  Meds: Nortec, spironolactone, Vit C, magnesium  Allx: NKDA  Social History: Denies smoking use, drug use, alcohol use    Vital Signs Last 24 Hrs  T(C): 36.7 (21 Dec 2023 09:12), Max: 36.7 (21 Dec 2023 09:12)  T(F): 98.1 (21 Dec 2023 09:12), Max: 98.1 (21 Dec 2023 09:12)  HR: 86 (21 Dec 2023 09:12) (86 - 86)  BP: 123/87 (21 Dec 2023 09:12) (123/87 - 123/87)  RR: 20 (21 Dec 2023 09:12) (20 - 20)  SpO2: 100% (21 Dec 2023 09:12) (100% - 100%)    Parameters below as of 21 Dec 2023 09:12  Patient On (Oxygen Delivery Method): room air    Physical Exam:   General: sitting comfortably in bed, NAD   HEENT: neck supple, full ROM  CV: extremities well perfused  Lungs: normal respiratory effort on room air  Back: No CVA tenderness  Abd: Soft, non-tender, non-distended  :  No bleeding on pad.  External labia wnl. Bimanual exam with no cervical motion tenderness, uterus wnl, adnexa non palpable b/l.  Cervix closed vs. Cervix dilated  Speculum Exam: No active bleeding from os.  Ext: non-tender b/l, no edema     LABS:                        11.4   13.77 )-----------( 337      ( 21 Dec 2023 11:04 )             36.3     12-    134<L>  |  101  |  11  ----------------------------<  85  4.5   |  22  |  0.57    Ca    9.1      21 Dec 2023 10:47    TPro  8.1  /  Alb  4.6  /  TBili  0.4  /  DBili  x   /  AST  20  /  ALT  13  /  AlkPhos  82  12-21    I&O's Detail    Urinalysis Basic - ( 21 Dec 2023 10:47 )    Color: x / Appearance: x / SG: x / pH: x  Gluc: 85 mg/dL / Ketone: x  / Bili: x / Urobili: x   Blood: x / Protein: x / Nitrite: x   Leuk Esterase: x / RBC: x / WBC x   Sq Epi: x / Non Sq Epi: x / Bacteria: x    RADIOLOGY & ADDITIONAL STUDIES:     ABIMAEL AARON  33y  Female [id number]    HPI: Patient is a 34yo  well known to [facility] GYN Oncology service for history of squamous cell carcinoma in situ (CIS) and 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 Patient reports that this morning at 7:30AM the patient had acute onset lower abdominal pain which felt like a more intense version of her menstrual pain. She reports having some light bleeding this morning as well. Around 8AM, she reported feeling dizzy, so she sat down but had no head strikes or loss of consciousness. Due to her weakness and pain, she called an ambulance which took her to [facility] despite her asking to go to [facility]. Her pain was 10/10 this morning, but after getting 15mg Toradol in the ED her pain is now 4/10. She last changed her pad this morning. Per the patient, her menses for the last few months since getting her CKC in August have become progressively heavier and more painful. She otherwise denies any HA, vision changes, CP, SOB, fevers, chills, nausea, vomiting, dysuria, diarrhea, constipation, abnormal vaginal discharge.     The patient is s/p a Cold Knife Cone biopsy on Aug 17 for which the pathology resulted as cervical AIS/CIS. The patient is scheduled for a Robot-Assisted Total Laparoscopic Hysterectomy on 2023 with Dr. Joshi.    Name of GYN Physician: Dr. Jimenez  Name of GYN Onc Physician: Dr. Joshi   OBHx:  x1, TOPx1  GynHx: PCOS, Denies fibroids, endometriosis, STI's. +Abn paps s/p CKC w/ AIS/CIS  PMH: migraines  PSH: Cold knife cone biopsy, vaginal biopsy, Liposuction, Abdominoplasty  Meds: Nortec  Allx: NKDA  Social History: Denies smoking use, drug use, alcohol use    Vital Signs Last 24 Hrs  T(C): 36.7 (21 Dec 2023 09:12), Max: 36.7 (21 Dec 2023 09:12)  T(F): 98.1 (21 Dec 2023 09:12), Max: 98.1 (21 Dec 2023 09:12)  HR: 86 (21 Dec 2023 09:12) (86 - 86)  BP: 123/87 (21 Dec 2023 09:12) (123/87 - 123/87)  RR: 20 (21 Dec 2023 09:12) (20 - 20)  SpO2: 100% (21 Dec 2023 09:12) (100% - 100%)    Parameters below as of 21 Dec 2023 09:12  Patient On (Oxygen Delivery Method): room air    Physical Exam:   General: sitting comfortably in bed, NAD   HEENT: neck supple, full ROM  CV: extremities well perfused  Lungs: normal respiratory effort on room air  Abd: Soft, non-tender, non-distended  :  No bleeding on pad  Ext: non-tender b/l, no edema     LABS:                        11.4   13.77 )-----------( 337      ( 21 Dec 2023 11:04 )             36.3     12    134<L>  |  101  |  11  ----------------------------<  85  4.5   |  22  |  0.57    Ca    9.1      21 Dec 2023 10:47    TPro  8.1  /  Alb  4.6  /  TBili  0.4  /  DBili  x   /  AST  20  /  ALT  13  /  AlkPhos  82  12-21    I&O's Detail    Urinalysis Basic - ( 21 Dec 2023 10:47 )    Color: x / Appearance: x / SG: x / pH: x  Gluc: 85 mg/dL / Ketone: x  / Bili: x / Urobili: x   Blood: x / Protein: x / Nitrite: x   Leuk Esterase: x / RBC: x / WBC x   Sq Epi: x / Non Sq Epi: x / Bacteria: x      PATHOLOGY:  CKC, vag biopsy, ECC, EMC (2023):  - Left upper vaginal biopsy: Fibromuscular tissue with overlying ulcer bed (granulation tissue with inflammatory exudate); epithelial cells are not identified  - Left upper vaginal lesion; biopsy: Fibromuscular tissue with overlying ulcerated and inflamed squamous mucosa  - Uterine cervix; cone biopsy: Squamous cell carcinoma in situ (CIS) with endocervical glandular extension, Endocervical adenocarcinoma in situ (AIS), Neoplastic changes are seen in 12-3 o'clock and 3-6 o'clock quadrants. Excisional margins are uninvolved. Acute and chronic cervicitis, mild  - ECC: Endocervical tissue with reactive epithelial atypia  - EMC: Chronic endometritis with proliferative changes    23 Colposcopy ([facility])  - Cervical biopsy: High-grade squamous intraepithelial lesion (HGSIL/DANIEL 2-3), Endocervical adenocarcinoma in situ (AIS)  - ECC: Endocervical adenocarcinoma in situ (AIS), High-grade squamous intraepithelial lesion (HGSIL/DANIEL 2-3)  - EMC: Proliferative endometrium, Fragment with features suggestive of endometrial polyp  PAP:  - 23: Atypical glandular cells; +HPV18/45; -HPV16  - 23: ASC-Hl +HPVmRNA  - 19: Negative for intraepithelial lesion or malignancy.         RADIOLOGY & ADDITIONAL STUDIES:    < from: US Doppler Pelvis (23 @ 12:17) >  PROCEDURE DATE:  2023          INTERPRETATION:  CLINICAL INFORMATION: 33 year old female with abnormal:   cone biopsy in 2023 scheduled for hysterectomy next week, with   pelvic pain. Evaluate for ovarian torsion, ectopic.. Beta hCG is unknown.    LMP: 2023    COMPARISON: None available.    TECHNIQUE:  Endovaginal and transabdominalpelvic sonogram. Color and Spectral   Doppler was performed.    FINDINGS:  Uterus: 8.7 cm x 5.0 x 5.6 cm. Anteverted. No fibroids are identified.   There are echogenic foci noted within the cervix  in the region of the   external cervical os possibly representing changes related to the prior   cone biopsy. No discrete cervical mass is identified on this exam.  Endometrium: The endometrial cavity is distended to approximately 2.3 cm.   There is mobile complex  avascular material of mixed echogenicity present   within the endometrial cavity with  hyperechoic  and hypoechoic   components  and complex, echogenic fluid. This extends into the   endocervical canal which is expanded, measuring up to approximately 1.2   cm in AP diameter,  with open internal cervical os. This is most   compatible with blood products. The endometrium is poorly delineated but   appears to surround this complex material, measuring approximately 9 mm   in double layer thickness. No vascularity is demonstrated within the   endometrium with color Doppler.  Right ovary: 2.3 cm x 1.6 cm x 1.7 cm. The right ovary is unremarkable,   containing small follicles. Normal arterial and venous waveforms are   demonstrated.  Left ovary: 2.3 cm x 1.3 cm x 2.2 cm. The left ovary is unremarkable,   containing small follicles. Normal arterial and venous waveforms are   demonstrated.    Fluid: Trace free fluid in the cul de sac    IMPRESSION:  1. The endometrial  cavity is distended measuring up to 2.3 cm and   contains complex material of mixed echogenicity and complex fluid   extending into the endocervical canal, which is expanded with open   internal cervical os. The findings are most compatible with blood   products. Assessment of the endometrium for abnormality, including mass,   is otherwise  limited in this setting. Echogenic foci are noted within   the cervix in the region of the external os, possibly representing   sequela of recent cone  biopsy. The possibility of  cervical stenosis   with retained blood products within the endometrial cavity and   endocervical canal is of uncertain. Clinical correlation is advised. No   discrete cervical mass is visualized on this exam. Short-term follow-up   ultrasound to reevaluate these findings is recommended.  2. Theovaries are unremarkable. No sonographic evidence for ovarian   torsion.  3. No adnexal masses are identified. Correlation with beta hCG levels  is   advised to exclude the possibility of occult ectopic pregnancy.      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 Patient reports that this morning at 7:30AM the patient had acute onset lower abdominal pain which felt like a more intense version of her menstrual pain. She reports having some light bleeding this morning as well. Around 8AM, she reported feeling dizzy, so she sat down but had no head strikes or loss of consciousness. Due to her weakness and pain, she called an ambulance which took her to [facility] despite her asking to go to [facility]. Her pain was 10/10 this morning, but after getting 15mg Toradol in the ED her pain is now 4/10. She last changed her pad this morning. Per the patient, her menses for the last few months since getting her CKC in August have become progressively heavier and more painful. She otherwise denies any HA, vision changes, CP, SOB, fevers, chills, nausea, vomiting, dysuria, diarrhea, constipation, abnormal vaginal discharge.     The patient is s/p a Cold Knife Cone biopsy on Aug 17 for which the pathology resulted as cervical AIS/CIS. The patient is scheduled for a Robot-Assisted Total Laparoscopic Hysterectomy on 2023 with Dr. Joshi.    Name of GYN Physician: Dr. Jimenez  Name of GYN Onc Physician: Dr. Joshi   OBHx:  x1, TOPx1  GynHx: PCOS, Denies fibroids, endometriosis, STI's. +Abn paps s/p CKC w/ AIS/CIS  PMH: migraines  PSH: Cold knife cone biopsy, vaginal biopsy, Liposuction, Abdominoplasty  Meds: Nortec  Allx: NKDA  Social History: Denies smoking use, drug use, alcohol use    Vital Signs Last 24 Hrs  T(C): 36.7 (21 Dec 2023 09:12), Max: 36.7 (21 Dec 2023 09:12)  T(F): 98.1 (21 Dec 2023 09:12), Max: 98.1 (21 Dec 2023 09:12)  HR: 86 (21 Dec 2023 09:12) (86 - 86)  BP: 123/87 (21 Dec 2023 09:12) (123/87 - 123/87)  RR: 20 (21 Dec 2023 09:12) (20 - 20)  SpO2: 100% (21 Dec 2023 09:12) (100% - 100%)    Parameters below as of 21 Dec 2023 09:12  Patient On (Oxygen Delivery Method): room air    Physical Exam:   General: sitting comfortably in bed, NAD   HEENT: neck supple, full ROM  CV: extremities well perfused  Lungs: normal respiratory effort on room air  Abd: Soft, non-tender, non-distended  :  No bleeding on pad  Ext: non-tender b/l, no edema     LABS:                        11.4   13.77 )-----------( 337      ( 21 Dec 2023 11:04 )             36.3     12    134<L>  |  101  |  11  ----------------------------<  85  4.5   |  22  |  0.57    Ca    9.1      21 Dec 2023 10:47    TPro  8.1  /  Alb  4.6  /  TBili  0.4  /  DBili  x   /  AST  20  /  ALT  13  /  AlkPhos  82  12-21    I&O's Detail    Urinalysis Basic - ( 21 Dec 2023 10:47 )    Color: x / Appearance: x / SG: x / pH: x  Gluc: 85 mg/dL / Ketone: x  / Bili: x / Urobili: x   Blood: x / Protein: x / Nitrite: x   Leuk Esterase: x / RBC: x / WBC x   Sq Epi: x / Non Sq Epi: x / Bacteria: x      PATHOLOGY:  CKC, vag biopsy, ECC, EMC (2023):  - Left upper vaginal biopsy: Fibromuscular tissue with overlying ulcer bed (granulation tissue with inflammatory exudate); epithelial cells are not identified  - Left upper vaginal lesion; biopsy: Fibromuscular tissue with overlying ulcerated and inflamed squamous mucosa  - Uterine cervix; cone biopsy: Squamous cell carcinoma in situ (CIS) with endocervical glandular extension, Endocervical adenocarcinoma in situ (AIS), Neoplastic changes are seen in 12-3 o'clock and 3-6 o'clock quadrants. Excisional margins are uninvolved. Acute and chronic cervicitis, mild  - ECC: Endocervical tissue with reactive epithelial atypia  - EMC: Chronic endometritis with proliferative changes    23 Colposcopy ([facility])  - Cervical biopsy: High-grade squamous intraepithelial lesion (HGSIL/DANIEL 2-3), Endocervical adenocarcinoma in situ (AIS)  - ECC: Endocervical adenocarcinoma in situ (AIS), High-grade squamous intraepithelial lesion (HGSIL/DANIEL 2-3)  - EMC: Proliferative endometrium, Fragment with features suggestive of endometrial polyp  PAP:  - 23: Atypical glandular cells; +HPV18/45; -HPV16  - 23: ASC-Hl +HPVmRNA  - 19: Negative for intraepithelial lesion or malignancy.         RADIOLOGY & ADDITIONAL STUDIES:    < from: US Doppler Pelvis (23 @ 12:17) >  PROCEDURE DATE:  2023          INTERPRETATION:  CLINICAL INFORMATION: 33 year old female with abnormal:   cone biopsy in 2023 scheduled for hysterectomy next week, with   pelvic pain. Evaluate for ovarian torsion, ectopic.. Beta hCG is unknown.    LMP: 2023    COMPARISON: None available.    TECHNIQUE:  Endovaginal and transabdominalpelvic sonogram. Color and Spectral   Doppler was performed.    FINDINGS:  Uterus: 8.7 cm x 5.0 x 5.6 cm. Anteverted. No fibroids are identified.   There are echogenic foci noted within the cervix  in the region of the   external cervical os possibly representing changes related to the prior   cone biopsy. No discrete cervical mass is identified on this exam.  Endometrium: The endometrial cavity is distended to approximately 2.3 cm.   There is mobile complex  avascular material of mixed echogenicity present   within the endometrial cavity with  hyperechoic  and hypoechoic   components  and complex, echogenic fluid. This extends into the   endocervical canal which is expanded, measuring up to approximately 1.2   cm in AP diameter,  with open internal cervical os. This is most   compatible with blood products. The endometrium is poorly delineated but   appears to surround this complex material, measuring approximately 9 mm   in double layer thickness. No vascularity is demonstrated within the   endometrium with color Doppler.  Right ovary: 2.3 cm x 1.6 cm x 1.7 cm. The right ovary is unremarkable,   containing small follicles. Normal arterial and venous waveforms are   demonstrated.  Left ovary: 2.3 cm x 1.3 cm x 2.2 cm. The left ovary is unremarkable,   containing small follicles. Normal arterial and venous waveforms are   demonstrated.    Fluid: Trace free fluid in the cul de sac    IMPRESSION:  1. The endometrial  cavity is distended measuring up to 2.3 cm and   contains complex material of mixed echogenicity and complex fluid   extending into the endocervical canal, which is expanded with open   internal cervical os. The findings are most compatible with blood   products. Assessment of the endometrium for abnormality, including mass,   is otherwise  limited in this setting. Echogenic foci are noted within   the cervix in the region of the external os, possibly representing   sequela of recent cone  biopsy. The possibility of  cervical stenosis   with retained blood products within the endometrial cavity and   endocervical canal is of uncertain. Clinical correlation is advised. No   discrete cervical mass is visualized on this exam. Short-term follow-up   ultrasound to reevaluate these findings is recommended.  2. Theovaries are unremarkable. No sonographic evidence for ovarian   torsion.  3. No adnexal masses are identified. Correlation with beta hCG levels  is   advised to exclude the possibility of occult ectopic pregnancy.      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 Patient reports that this morning at 7:30AM the patient had acute onset lower abdominal pain which felt like a more intense version of her menstrual pain. She reports having some light bleeding this morning as well. Around 8AM, she reported feeling dizzy, so she sat down but had no head strikes or loss of consciousness. Due to her weakness and pain, she called an ambulance which took her to [facility] despite her asking to go to [facility]. Her pain was 10/10 this morning, but after getting 15mg Toradol in the ED her pain is now 4/10. She last changed her pad this morning. Per the patient, her menses for the last few months since getting her CKC in August have become progressively heavier and more painful. She otherwise denies any HA, vision changes, CP, SOB, fevers, chills, nausea, vomiting, dysuria, diarrhea, constipation, abnormal vaginal discharge.     The patient is s/p a Cold Knife Cone biopsy on Aug 17 for which the pathology resulted as cervical AIS/CIS. The patient is scheduled for a Robot-Assisted Total Laparoscopic Hysterectomy on 2023 with Dr. Joshi.    Name of GYN Physician: Dr. Jimenez  Name of GYN Onc Physician: Dr. Joshi   OBHx:  x1, TOPx1  GynHx: PCOS, Denies fibroids, endometriosis, STI's. +Abn paps s/p CKC w/ AIS/CIS  PMH: migraines  PSH: Cold knife cone biopsy, vaginal biopsy, Liposuction, Abdominoplasty  Meds: Nortec  Allx: NKDA  Social History: Denies smoking use, drug use, alcohol use    Vital Signs Last 24 Hrs  T(C): 36.7 (21 Dec 2023 09:12), Max: 36.7 (21 Dec 2023 09:12)  T(F): 98.1 (21 Dec 2023 09:12), Max: 98.1 (21 Dec 2023 09:12)  HR: 86 (21 Dec 2023 09:12) (86 - 86)  BP: 123/87 (21 Dec 2023 09:12) (123/87 - 123/87)  RR: 20 (21 Dec 2023 09:12) (20 - 20)  SpO2: 100% (21 Dec 2023 09:12) (100% - 100%)    Parameters below as of 21 Dec 2023 09:12  Patient On (Oxygen Delivery Method): room air    Physical Exam:   General: sitting comfortably in bed, NAD   HEENT: neck supple, full ROM  CV: extremities well perfused  Lungs: normal respiratory effort on room air  Abd: Soft, non-tender, non-distended  :  No bleeding on pad  Pelvic: Deferred examination  Ext: non-tender b/l, no edema     LABS:                        11.4   13.77 )-----------( 337      ( 21 Dec 2023 11:04 )             36.3     12    134<L>  |  101  |  11  ----------------------------<  85  4.5   |  22  |  0.57    Ca    9.1      21 Dec 2023 10:47    TPro  8.1  /  Alb  4.6  /  TBili  0.4  /  DBili  x   /  AST  20  /  ALT  13  /  AlkPhos  82  12-21    I&O's Detail    Urinalysis Basic - ( 21 Dec 2023 10:47 )    Color: x / Appearance: x / SG: x / pH: x  Gluc: 85 mg/dL / Ketone: x  / Bili: x / Urobili: x   Blood: x / Protein: x / Nitrite: x   Leuk Esterase: x / RBC: x / WBC x   Sq Epi: x / Non Sq Epi: x / Bacteria: x      PATHOLOGY:  CKC, vag biopsy, ECC, EMC (2023):  - Left upper vaginal biopsy: Fibromuscular tissue with overlying ulcer bed (granulation tissue with inflammatory exudate); epithelial cells are not identified  - Left upper vaginal lesion; biopsy: Fibromuscular tissue with overlying ulcerated and inflamed squamous mucosa  - Uterine cervix; cone biopsy: Squamous cell carcinoma in situ (CIS) with endocervical glandular extension, Endocervical adenocarcinoma in situ (AIS), Neoplastic changes are seen in 12-3 o'clock and 3-6 o'clock quadrants. Excisional margins are uninvolved. Acute and chronic cervicitis, mild  - ECC: Endocervical tissue with reactive epithelial atypia  - EMC: Chronic endometritis with proliferative changes    23 Colposcopy ([facility])  - Cervical biopsy: High-grade squamous intraepithelial lesion (HGSIL/DANIEL 2-3), Endocervical adenocarcinoma in situ (AIS)  - ECC: Endocervical adenocarcinoma in situ (AIS), High-grade squamous intraepithelial lesion (HGSIL/DANIEL 2-3)  - EMC: Proliferative endometrium, Fragment with features suggestive of endometrial polyp  PAP:  - 23: Atypical glandular cells; +HPV18/45; -HPV16  - 23: ASC-Hl +HPVmRNA  - 19: Negative for intraepithelial lesion or malignancy.         RADIOLOGY & ADDITIONAL STUDIES:    < from: US Doppler Pelvis (23 @ 12:17) >  PROCEDURE DATE:  2023          INTERPRETATION:  CLINICAL INFORMATION: 33 year old female with abnormal:   cone biopsy in 2023 scheduled for hysterectomy next week, with   pelvic pain. Evaluate for ovarian torsion, ectopic.. Beta hCG is unknown.    LMP: 2023    COMPARISON: None available.    TECHNIQUE:  Endovaginal and transabdominalpelvic sonogram. Color and Spectral   Doppler was performed.    FINDINGS:  Uterus: 8.7 cm x 5.0 x 5.6 cm. Anteverted. No fibroids are identified.   There are echogenic foci noted within the cervix  in the region of the   external cervical os possibly representing changes related to the prior   cone biopsy. No discrete cervical mass is identified on this exam.  Endometrium: The endometrial cavity is distended to approximately 2.3 cm.   There is mobile complex  avascular material of mixed echogenicity present   within the endometrial cavity with  hyperechoic  and hypoechoic   components  and complex, echogenic fluid. This extends into the   endocervical canal which is expanded, measuring up to approximately 1.2   cm in AP diameter,  with open internal cervical os. This is most   compatible with blood products. The endometrium is poorly delineated but   appears to surround this complex material, measuring approximately 9 mm   in double layer thickness. No vascularity is demonstrated within the   endometrium with color Doppler.  Right ovary: 2.3 cm x 1.6 cm x 1.7 cm. The right ovary is unremarkable,   containing small follicles. Normal arterial and venous waveforms are   demonstrated.  Left ovary: 2.3 cm x 1.3 cm x 2.2 cm. The left ovary is unremarkable,   containing small follicles. Normal arterial and venous waveforms are   demonstrated.    Fluid: Trace free fluid in the cul de sac    IMPRESSION:  1. The endometrial  cavity is distended measuring up to 2.3 cm and   contains complex material of mixed echogenicity and complex fluid   extending into the endocervical canal, which is expanded with open   internal cervical os. The findings are most compatible with blood   products. Assessment of the endometrium for abnormality, including mass,   is otherwise  limited in this setting. Echogenic foci are noted within   the cervix in the region of the external os, possibly representing   sequela of recent cone  biopsy. The possibility of  cervical stenosis   with retained blood products within the endometrial cavity and   endocervical canal is of uncertain. Clinical correlation is advised. No   discrete cervical mass is visualized on this exam. Short-term follow-up   ultrasound to reevaluate these findings is recommended.  2. Theovaries are unremarkable. No sonographic evidence for ovarian   torsion.  3. No adnexal masses are identified. Correlation with beta hCG levels  is   advised to exclude the possibility of occult ectopic pregnancy.      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 Patient reports that this morning at 7:30AM the patient had acute onset lower abdominal pain which felt like a more intense version of her menstrual pain. She reports having some light bleeding this morning as well. Around 8AM, she reported feeling dizzy, so she sat down but had no head strikes or loss of consciousness. Due to her weakness and pain, she called an ambulance which took her to [facility] despite her asking to go to [facility]. Her pain was 10/10 this morning, but after getting 15mg Toradol in the ED her pain is now 4/10. She last changed her pad this morning. Per the patient, her menses for the last few months since getting her CKC in August have become progressively heavier and more painful. She otherwise denies any HA, vision changes, CP, SOB, fevers, chills, nausea, vomiting, dysuria, diarrhea, constipation, abnormal vaginal discharge.     The patient is s/p a Cold Knife Cone biopsy on Aug 17 for which the pathology resulted as cervical AIS/CIS. The patient is scheduled for a Robot-Assisted Total Laparoscopic Hysterectomy on 2023 with Dr. Joshi.    Name of GYN Physician: Dr. Jimenez  Name of GYN Onc Physician: Dr. Joshi   OBHx:  x1, TOPx1  GynHx: PCOS, Denies fibroids, endometriosis, STI's. +Abn paps s/p CKC w/ AIS/CIS  PMH: migraines  PSH: Cold knife cone biopsy, vaginal biopsy, Liposuction, Abdominoplasty  Meds: [employer]  Allx: NKDA  Social History: Denies smoking use, drug use, alcohol use    Vital Signs Last 24 Hrs  T(C): 36.7 (21 Dec 2023 09:12), Max: 36.7 (21 Dec 2023 09:12)  T(F): 98.1 (21 Dec 2023 09:12), Max: 98.1 (21 Dec 2023 09:12)  HR: 86 (21 Dec 2023 09:12) (86 - 86)  BP: 123/87 (21 Dec 2023 09:12) (123/87 - 123/87)  RR: 20 (21 Dec 2023 09:12) (20 - 20)  SpO2: 100% (21 Dec 2023 09:12) (100% - 100%)    Parameters below as of 21 Dec 2023 09:12  Patient On (Oxygen Delivery Method): room air    Physical Exam:   General: sitting comfortably in bed, NAD   HEENT: neck supple, full ROM  CV: extremities well perfused  Lungs: normal respiratory effort on room air  Abd: Soft, non-tender, non-distended  :  No bleeding on pad  Pelvic: Deferred examination  Ext: non-tender b/l, no edema     LABS:                        11.4   13.77 )-----------( 337      ( 21 Dec 2023 11:04 )             36.3     12    134<L>  |  101  |  11  ----------------------------<  85  4.5   |  22  |  0.57    Ca    9.1      21 Dec 2023 10:47    TPro  8.1  /  Alb  4.6  /  TBili  0.4  /  DBili  x   /  AST  20  /  ALT  13  /  AlkPhos  82  12-21    I&O's Detail    Urinalysis Basic - ( 21 Dec 2023 10:47 )    Color: x / Appearance: x / SG: x / pH: x  Gluc: 85 mg/dL / Ketone: x  / Bili: x / Urobili: x   Blood: x / Protein: x / Nitrite: x   Leuk Esterase: x / RBC: x / WBC x   Sq Epi: x / Non Sq Epi: x / Bacteria: x      PATHOLOGY:  CKC, vag biopsy, ECC, EMC (2023):  - Left upper vaginal biopsy: Fibromuscular tissue with overlying ulcer bed (granulation tissue with inflammatory exudate); epithelial cells are not identified  - Left upper vaginal lesion; biopsy: Fibromuscular tissue with overlying ulcerated and inflamed squamous mucosa  - Uterine cervix; cone biopsy: Squamous cell carcinoma in situ (CIS) with endocervical glandular extension, Endocervical adenocarcinoma in situ (AIS), Neoplastic changes are seen in 12-3 o'clock and 3-6 o'clock quadrants. Excisional margins are uninvolved. Acute and chronic cervicitis, mild  - ECC: Endocervical tissue with reactive epithelial atypia  - EMC: Chronic endometritis with proliferative changes    23 Colposcopy ([facility])  - Cervical biopsy: High-grade squamous intraepithelial lesion (HGSIL/DANIEL 2-3), Endocervical adenocarcinoma in situ (AIS)  - ECC: Endocervical adenocarcinoma in situ (AIS), High-grade squamous intraepithelial lesion (HGSIL/DANIEL 2-3)  - EMC: Proliferative endometrium, Fragment with features suggestive of endometrial polyp  PAP:  - 23: Atypical glandular cells; +HPV18/45; -HPV16  - 23: ASC-Hl +HPVmRNA  - 19: Negative for intraepithelial lesion or malignancy.         RADIOLOGY & ADDITIONAL STUDIES:    < from: US Doppler Pelvis (23 @ 12:17) >  PROCEDURE DATE:  2023          INTERPRETATION:  CLINICAL INFORMATION: 33 year old female with abnormal:   cone biopsy in 2023 scheduled for hysterectomy next week, with   pelvic pain. Evaluate for ovarian torsion, ectopic.. Beta hCG is unknown.    LMP: 2023    COMPARISON: None available.    TECHNIQUE:  Endovaginal and transabdominalpelvic sonogram. Color and Spectral   Doppler was performed.    FINDINGS:  Uterus: 8.7 cm x 5.0 x 5.6 cm. Anteverted. No fibroids are identified.   There are echogenic foci noted within the cervix  in the region of the   external cervical os possibly representing changes related to the prior   cone biopsy. No discrete cervical mass is identified on this exam.  Endometrium: The endometrial cavity is distended to approximately 2.3 cm.   There is mobile complex  avascular material of mixed echogenicity present   within the endometrial cavity with  hyperechoic  and hypoechoic   components  and complex, echogenic fluid. This extends into the   endocervical canal which is expanded, measuring up to approximately 1.2   cm in AP diameter,  with open internal cervical os. This is most   compatible with blood products. The endometrium is poorly delineated but   appears to surround this complex material, measuring approximately 9 mm   in double layer thickness. No vascularity is demonstrated within the   endometrium with color Doppler.  Right ovary: 2.3 cm x 1.6 cm x 1.7 cm. The right ovary is unremarkable,   containing small follicles. Normal arterial and venous waveforms are   demonstrated.  Left ovary: 2.3 cm x 1.3 cm x 2.2 cm. The left ovary is unremarkable,   containing small follicles. Normal arterial and venous waveforms are   demonstrated.    Fluid: Trace free fluid in the cul de sac    IMPRESSION:  1. The endometrial  cavity is distended measuring up to 2.3 cm and   contains complex material of mixed echogenicity and complex fluid   extending into the endocervical canal, which is expanded with open   internal cervical os. The findings are most compatible with blood   products. Assessment of the endometrium for abnormality, including mass,   is otherwise  limited in this setting. Echogenic foci are noted within   the cervix in the region of the external os, possibly representing   sequela of recent cone  biopsy. The possibility of  cervical stenosis   with retained blood products within the endometrial cavity and   endocervical canal is of uncertain. Clinical correlation is advised. No   discrete cervical mass is visualized on this exam. Short-term follow-up   ultrasound to reevaluate these findings is recommended.  2. Theovaries are unremarkable. No sonographic evidence for ovarian   torsion.  3. No adnexal masses are identified. Correlation with beta hCG levels  is   advised to exclude the possibility of occult ectopic pregnancy.      < end of copied text >

## 2023-12-21 NOTE — ED PROVIDER NOTE - CLINICAL SUMMARY MEDICAL DECISION MAKING FREE TEXT BOX
With complaint of lower abdominal pain on her menses is concerning for ovarian torsion topic pregnancy fibroids endometriosis and pain related to her cancer.  Patient will receive a transvaginal ultrasound ECG basic labs will be ordered to assess for anemia.

## 2023-12-21 NOTE — CONSULT NOTE ADULT - ASSESSMENT
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 Patient is s/p a cold knife cone biopsy in August, now scheduled for a Robot-Assisted Total Laparoscopic Hysterectomy with Dr. Joshi on 23. On arrival to the ED, patient's H/H 11.4/36.3 and vital signs stable. After receiving 1 dose of Toradol, patient's pain is well controlled. TVUS with endometrial cavity distended to 2.3cm with echogenic fluid most compatible with blood products. Patient reports currently bleeding on her menses so suspicion for hematometra secondary to cervical stenosis low at this time. Patient is likely having pain related to the onset of her menses.    - No acute GYN intervention at this time  - Patient's H/H and vitals are normal  - Recommend patient take Tylenol for menstrual cramps. Avoid NSAIDs in anticipation of surgery next week  - Return precautions reviewed with the patient, she will present to [facility] next week for scheduled surgery    d/w Dr. 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 Patient is s/p a cold knife cone biopsy in August, now scheduled for a Robot-Assisted Total Laparoscopic Hysterectomy with Dr. Joshi on 23. On arrival to the ED, patient's H/H 11.4/36.3 and vital signs stable. After receiving 1 dose of Toradol, patient's pain is well controlled. TVUS with endometrial cavity distended to 2.3cm with echogenic fluid most compatible with blood products. Patient reports currently bleeding on her menses so suspicion for hematometra secondary to cervical stenosis low at this time. Patient is likely having pain related to the onset of her menses.    - No acute GYN intervention at this time  - Patient's H/H and vitals are normal  - Recommend patient take Tylenol for menstrual cramps. Avoid NSAIDs in anticipation of surgery next week  - Return precautions reviewed with the patient, she will present to [facility] next week for scheduled surgery    d/w Dr. 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 Patient is s/p a cold knife cone biopsy in August, now scheduled for a Robot-Assisted Total Laparoscopic Hysterectomy with Dr. Joshi on 23. On arrival to the ED, patient's H/H 11.4/36.3 and vital signs stable. After receiving 1 dose of Toradol, patient's pain is well controlled. TVUS with endometrial cavity distended to 2.3cm with echogenic fluid most compatible with blood products. Patient reports currently bleeding on her menses so suspicion for hematometra secondary to complete cervical stenosis low at this time. Patient is likely having pain related to the onset of her menses.    - No acute GYN intervention at this time  - Patient's H/H and vitals are normal  - Recommend patient take Tylenol for menstrual cramps. Avoid NSAIDs in anticipation of surgery next week  - Return precautions reviewed with the patient, she will present to [facility] next week for scheduled surgery    d/w Dr. 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 Patient is s/p a cold knife cone biopsy in August, now scheduled for a Robot-Assisted Total Laparoscopic Hysterectomy with Dr. Joshi on 23. On arrival to the ED, patient's H/H 11.4/36.3 and vital signs stable. After receiving 1 dose of Toradol, patient's pain is well controlled. TVUS with endometrial cavity distended to 2.3cm with echogenic fluid most compatible with blood products. Patient reports currently bleeding on her menses so suspicion for hematometra secondary to complete cervical stenosis low at this time. Patient is likely having pain related to the onset of her menses.    - No acute GYN intervention at this time  - Patient's H/H and vitals are normal  - Recommend patient take Tylenol for menstrual cramps. Avoid NSAIDs in anticipation of surgery next week  - Return precautions reviewed with the patient, she will present to [facility] next week for scheduled surgery    d/w Dr. Nunez  seen with Madisyn Huizar PGY4  Kamala Millan PGY1

## 2023-12-21 NOTE — ED PROVIDER NOTE - NSFOLLOWUPINSTRUCTIONS_ED_ALL_ED_FT
Please do not take any other nsaids due to your surgery coming up    Please return if you develop new concerning symptoms     You can use 500-1000mg Tylenol every 6 hours for pain - as needed.  This is an over-the-counter medications - please respect the warnings on the label. This medication come with certain risks and side effects that you need to discuss with your doctor, especially if you are taking it for a prolonged period.

## 2023-12-21 NOTE — ED PROVIDER NOTE - PROGRESS NOTE DETAILS
was contacted by ultrasound attending about the appearance of possible strictures due to retention of products of menstruation in the uterus and cervix. OBGYN was consulted and agreed to see the pt  Pt was updated and acknowledged understanding contacted by ultrasound attending about the appearance of possible strictures due to retention of products of menstruation in the uterus and cervix. OBGYN was consulted and agreed to see the pt  Pt was updated and acknowledged understanding Pt was updated on OBGYN recommendations and felt comfortable going home. Was given return instructions and Medication regimen

## 2023-12-22 ENCOUNTER — NON-APPOINTMENT (OUTPATIENT)
Age: 33
End: 2023-12-22

## 2023-12-22 ENCOUNTER — OUTPATIENT (OUTPATIENT)
Dept: OUTPATIENT SERVICES | Facility: HOSPITAL | Age: 33
LOS: 1 days | End: 2023-12-22

## 2023-12-22 VITALS
SYSTOLIC BLOOD PRESSURE: 113 MMHG | TEMPERATURE: 98 F | RESPIRATION RATE: 16 BRPM | OXYGEN SATURATION: 99 % | HEIGHT: 68 IN | DIASTOLIC BLOOD PRESSURE: 78 MMHG | WEIGHT: 167.99 LBS | HEART RATE: 71 BPM

## 2023-12-22 DIAGNOSIS — Z98.890 OTHER SPECIFIED POSTPROCEDURAL STATES: Chronic | ICD-10-CM

## 2023-12-22 DIAGNOSIS — D06.9 CARCINOMA IN SITU OF CERVIX, UNSPECIFIED: ICD-10-CM

## 2023-12-22 LAB
A1C WITH ESTIMATED AVERAGE GLUCOSE RESULT: 5 % — SIGNIFICANT CHANGE UP (ref 4–5.6)
A1C WITH ESTIMATED AVERAGE GLUCOSE RESULT: 5 % — SIGNIFICANT CHANGE UP (ref 4–5.6)
APPEARANCE UR: CLEAR — SIGNIFICANT CHANGE UP
APPEARANCE UR: CLEAR — SIGNIFICANT CHANGE UP
BILIRUB UR-MCNC: NEGATIVE — SIGNIFICANT CHANGE UP
BILIRUB UR-MCNC: NEGATIVE — SIGNIFICANT CHANGE UP
BLD GP AB SCN SERPL QL: NEGATIVE — SIGNIFICANT CHANGE UP
BLD GP AB SCN SERPL QL: NEGATIVE — SIGNIFICANT CHANGE UP
COLOR SPEC: YELLOW — SIGNIFICANT CHANGE UP
COLOR SPEC: YELLOW — SIGNIFICANT CHANGE UP
DIFF PNL FLD: ABNORMAL
DIFF PNL FLD: ABNORMAL
ESTIMATED AVERAGE GLUCOSE: 97 — SIGNIFICANT CHANGE UP
ESTIMATED AVERAGE GLUCOSE: 97 — SIGNIFICANT CHANGE UP
GLUCOSE UR QL: NEGATIVE MG/DL — SIGNIFICANT CHANGE UP
GLUCOSE UR QL: NEGATIVE MG/DL — SIGNIFICANT CHANGE UP
HCT VFR BLD CALC: 30.5 % — LOW (ref 34.5–45)
HCT VFR BLD CALC: 30.5 % — LOW (ref 34.5–45)
HGB BLD-MCNC: 9.6 G/DL — LOW (ref 11.5–15.5)
HGB BLD-MCNC: 9.6 G/DL — LOW (ref 11.5–15.5)
KETONES UR-MCNC: NEGATIVE MG/DL — SIGNIFICANT CHANGE UP
KETONES UR-MCNC: NEGATIVE MG/DL — SIGNIFICANT CHANGE UP
LEUKOCYTE ESTERASE UR-ACNC: ABNORMAL
LEUKOCYTE ESTERASE UR-ACNC: ABNORMAL
MCHC RBC-ENTMCNC: 27.9 PG — SIGNIFICANT CHANGE UP (ref 27–34)
MCHC RBC-ENTMCNC: 27.9 PG — SIGNIFICANT CHANGE UP (ref 27–34)
MCHC RBC-ENTMCNC: 31.5 GM/DL — LOW (ref 32–36)
MCHC RBC-ENTMCNC: 31.5 GM/DL — LOW (ref 32–36)
MCV RBC AUTO: 88.7 FL — SIGNIFICANT CHANGE UP (ref 80–100)
MCV RBC AUTO: 88.7 FL — SIGNIFICANT CHANGE UP (ref 80–100)
NITRITE UR-MCNC: NEGATIVE — SIGNIFICANT CHANGE UP
NITRITE UR-MCNC: NEGATIVE — SIGNIFICANT CHANGE UP
NRBC # BLD: 0 /100 WBCS — SIGNIFICANT CHANGE UP (ref 0–0)
NRBC # BLD: 0 /100 WBCS — SIGNIFICANT CHANGE UP (ref 0–0)
NRBC # FLD: 0 K/UL — SIGNIFICANT CHANGE UP (ref 0–0)
NRBC # FLD: 0 K/UL — SIGNIFICANT CHANGE UP (ref 0–0)
PH UR: 6.5 — SIGNIFICANT CHANGE UP (ref 5–8)
PH UR: 6.5 — SIGNIFICANT CHANGE UP (ref 5–8)
PLATELET # BLD AUTO: 279 K/UL — SIGNIFICANT CHANGE UP (ref 150–400)
PLATELET # BLD AUTO: 279 K/UL — SIGNIFICANT CHANGE UP (ref 150–400)
PROT UR-MCNC: SIGNIFICANT CHANGE UP MG/DL
PROT UR-MCNC: SIGNIFICANT CHANGE UP MG/DL
RBC # BLD: 3.44 M/UL — LOW (ref 3.8–5.2)
RBC # BLD: 3.44 M/UL — LOW (ref 3.8–5.2)
RBC # FLD: 13.6 % — SIGNIFICANT CHANGE UP (ref 10.3–14.5)
RBC # FLD: 13.6 % — SIGNIFICANT CHANGE UP (ref 10.3–14.5)
RH IG SCN BLD-IMP: POSITIVE — SIGNIFICANT CHANGE UP
RH IG SCN BLD-IMP: POSITIVE — SIGNIFICANT CHANGE UP
SP GR SPEC: 1.02 — SIGNIFICANT CHANGE UP (ref 1–1.03)
SP GR SPEC: 1.02 — SIGNIFICANT CHANGE UP (ref 1–1.03)
UROBILINOGEN FLD QL: 1 MG/DL — SIGNIFICANT CHANGE UP (ref 0.2–1)
UROBILINOGEN FLD QL: 1 MG/DL — SIGNIFICANT CHANGE UP (ref 0.2–1)
WBC # BLD: 6.99 K/UL — SIGNIFICANT CHANGE UP (ref 3.8–10.5)
WBC # BLD: 6.99 K/UL — SIGNIFICANT CHANGE UP (ref 3.8–10.5)
WBC # FLD AUTO: 6.99 K/UL — SIGNIFICANT CHANGE UP (ref 3.8–10.5)
WBC # FLD AUTO: 6.99 K/UL — SIGNIFICANT CHANGE UP (ref 3.8–10.5)

## 2023-12-22 RX ORDER — SODIUM CHLORIDE 9 MG/ML
1000 INJECTION, SOLUTION INTRAVENOUS
Refills: 0 | Status: DISCONTINUED | OUTPATIENT
Start: 2023-12-28 | End: 2023-12-28

## 2023-12-22 RX ORDER — SPIRONOLACTONE 25 MG/1
1 TABLET, FILM COATED ORAL
Refills: 0 | DISCHARGE

## 2023-12-22 RX ORDER — CHLORHEXIDINE GLUCONATE 213 G/1000ML
1 SOLUTION TOPICAL DAILY
Refills: 0 | Status: DISCONTINUED | OUTPATIENT
Start: 2023-12-28 | End: 2024-01-11

## 2023-12-22 RX ORDER — SODIUM CHLORIDE 9 MG/ML
3 INJECTION INTRAMUSCULAR; INTRAVENOUS; SUBCUTANEOUS EVERY 8 HOURS
Refills: 0 | Status: DISCONTINUED | OUTPATIENT
Start: 2023-12-28 | End: 2024-01-11

## 2023-12-22 RX ORDER — FLUCONAZOLE 150 MG/1
150 TABLET ORAL
Qty: 1 | Refills: 0 | Status: ACTIVE | COMMUNITY
Start: 2023-12-22 | End: 1900-01-01

## 2023-12-22 RX ORDER — METRONIDAZOLE 500 MG/1
500 TABLET ORAL TWICE DAILY
Qty: 14 | Refills: 0 | Status: ACTIVE | COMMUNITY
Start: 2023-12-22 | End: 1900-01-01

## 2023-12-22 RX ORDER — ACETAMINOPHEN 500 MG
3 TABLET ORAL
Qty: 0 | Refills: 0 | DISCHARGE

## 2023-12-22 RX ORDER — IBUPROFEN 200 MG
1 TABLET ORAL
Qty: 0 | Refills: 0 | DISCHARGE

## 2023-12-22 NOTE — H&P PST ADULT - NSICDXPASTSURGICALHX_GEN_ALL_CORE_FT
PAST SURGICAL HISTORY:  H/O abdominoplasty     H/O abdominoplasty     H/O cone biopsy of cervix

## 2023-12-22 NOTE — H&P PST ADULT - HISTORY OF PRESENT ILLNESS
33 y.o female with found to have Carcinoma In Situ of Cervix, s/p cone biopsy 8/2023, reports very painful and heavy menstrual cycle, lasts 6 days and changes soaked pad every hour for on day 2 and 3. Scheduled for robotic assisted total laparoscopic hysterectomy bilateral salpingectomy, cystoscopy possible open      33 y.o female presents with carcinomain situ of cervix, s/p cone biopsy 8/2023, states she has been experiencing very painful and heavy menstrual cycle, cycle lasts ~6 days, on 12/21/23 ambulance transport to [facility] for severe pelvic pain. Scheduled for robotic assisted total laparoscopic hysterectomy bilateral salpingectomy, cystoscopy possible open      33 y.o female presents with carcinomain situ of cervix, s/p cone biopsy 8/2023, states she has been experiencing very painful and heavy menstrual cycle, cycle lasts ~6 days, on 12/21/23 ambulance transport to [facility] for severe pelvic pain. Scheduled for robotic assisted total laparoscopic hysterectomy bilateral salpingectomy, cystoscopy possible open

## 2023-12-22 NOTE — H&P PST ADULT - NSICDXPASTMEDICALHX_GEN_ALL_CORE_FT
PAST MEDICAL HISTORY:  Anemia     Bacterial vaginosis     CIS (carcinoma in situ of cervix)     History of blood transfusion     Migraine     Uterine cervix cancer

## 2023-12-22 NOTE — H&P PST ADULT - ATTENDING COMMENTS
Patient seen in ASU, no changes reported. Consent form reviewed including examination by learner and possible conversion to open procedure. All questions answered. Case reviewed with circulating OR team.    Shereen Joshi MD

## 2023-12-22 NOTE — H&P PST ADULT - PROBLEM SELECTOR PLAN 1
Patient scheduled for surgery on: 12/28/23  Provided with verbal and written presurgical instructions  Verbalized understanding  with teach back on the following: Famotidine for GI prophylaxis and chlorhexidine wash    Lab specimen drawn at PST today: cbc, hga1c, type, ua, urine cx     Medical evaluation- patient reports she obtained medical clearance one week ago  Surgeon emailed results of CBC and hx- recurrent BV

## 2023-12-22 NOTE — H&P PST ADULT - GENERAL COMMENTS
Went to hospital yesterday via ambulance for nausea, pelvic pain '10/10' couldn't walk, CBC- noted WBC 13.77- states after cone biopsy has had three BV infection and last treated three weeks ago Went to hospital yesterday via ambulance for nausea, pelvic pain '10/10' couldn't walk, CBC- noted WBC 13.77- Of note, states after cone biopsy has had three BV infection last treatment three weeks ago

## 2023-12-22 NOTE — H&P PST ADULT - RESPIRATORY
normal/clear to auscultation bilaterally/no wheezes/no rales/no respiratory distress/good air movement

## 2023-12-22 NOTE — H&P PST ADULT - BP NONINVASIVE DIASTOLIC (MM HG)
Immunosuppresion Meds: Tacrolimus  Patient Notified: 01/20/23  Patient Notified Time: 1314  Was Provider Consulted: yes  Provider Consulted: Bipin  Last Creatinine: 0.74  Tacrolimus Result Date: 01/20/23  Tacrolimus Test Result: 10.6  Current Dose: 2.5mg/2mg  Current Range Goal: 9-12  New Dose: 2.5mg/2mg  New Range Goal: 6-9  Comments: recheck 1 week      Message left to inform patient to decrease tacrolimus dose with new lower range goal and need to repeat labs in 1 week to include tacrolimus level  Will also need [facility] labs in 1 month-will attempt to arrange remote draw.   
78

## 2023-12-24 LAB
CULTURE RESULTS: ABNORMAL
CULTURE RESULTS: ABNORMAL
SPECIMEN SOURCE: SIGNIFICANT CHANGE UP
SPECIMEN SOURCE: SIGNIFICANT CHANGE UP

## 2023-12-26 ENCOUNTER — NON-APPOINTMENT (OUTPATIENT)
Age: 33
End: 2023-12-26

## 2023-12-26 RX ORDER — AMOXICILLIN AND CLAVULANATE POTASSIUM 875; 125 MG/1; MG/1
875-125 TABLET, COATED ORAL
Qty: 14 | Refills: 0 | Status: ACTIVE | COMMUNITY
Start: 2023-12-26 | End: 1900-01-01

## 2023-12-27 ENCOUNTER — TRANSCRIPTION ENCOUNTER (OUTPATIENT)
Age: 33
End: 2023-12-27

## 2023-12-27 NOTE — ASU PATIENT PROFILE, ADULT - MUTUALITY COMMENT, PROFILE
n/a Discussed with Pt her ability to have questions answered by dr Joshi & anesthesia before going into the OR

## 2023-12-27 NOTE — ASU PATIENT PROFILE, ADULT - REASON FOR ADMISSION, PROFILE
robotic total lap hysterectomy, bilateral salpingectomy, cysto 'I'm having robotic total lap hysterectomy, bilateral salpingectomy, cysto'

## 2023-12-27 NOTE — ASU PATIENT PROFILE, ADULT - FALL HARM RISK - UNIVERSAL INTERVENTIONS
Bed in lowest position, wheels locked, appropriate side rails in place/Call bell, personal items and telephone in reach/Instruct patient to call for assistance before getting out of bed or chair/Non-slip footwear when patient is out of bed/[place] to call system/Physically safe environment - no spills, clutter or unnecessary equipment/Purposeful Proactive Rounding/Room/bathroom lighting operational, light cord in reach Bed in lowest position, wheels locked, appropriate side rails in place/Call bell, personal items and telephone in reach/Instruct patient to call for assistance before getting out of bed or chair/Non-slip footwear when patient is out of bed/[place] to call system/Physically safe environment - no spills, clutter or unnecessary equipment/Purposeful Proactive Rounding/Room/bathroom lighting operational, light cord in reach

## 2023-12-28 ENCOUNTER — OUTPATIENT (OUTPATIENT)
Dept: OUTPATIENT SERVICES | Facility: HOSPITAL | Age: 33
LOS: 1 days | Discharge: HOME | End: 2023-12-28
Payer: COMMERCIAL

## 2023-12-28 ENCOUNTER — RESULT REVIEW (OUTPATIENT)
Age: 33
End: 2023-12-28

## 2023-12-28 ENCOUNTER — TRANSCRIPTION ENCOUNTER (OUTPATIENT)
Age: 33
End: 2023-12-28

## 2023-12-28 ENCOUNTER — APPOINTMENT (OUTPATIENT)
Dept: GYNECOLOGIC ONCOLOGY | Facility: HOSPITAL | Age: 33
End: 2023-12-28

## 2023-12-28 VITALS
TEMPERATURE: 98 F | DIASTOLIC BLOOD PRESSURE: 76 MMHG | OXYGEN SATURATION: 99 % | RESPIRATION RATE: 15 BRPM | HEART RATE: 85 BPM | SYSTOLIC BLOOD PRESSURE: 115 MMHG

## 2023-12-28 VITALS
OXYGEN SATURATION: 100 % | WEIGHT: 167.99 LBS | SYSTOLIC BLOOD PRESSURE: 118 MMHG | TEMPERATURE: 98 F | HEART RATE: 79 BPM | HEIGHT: 68 IN | RESPIRATION RATE: 14 BRPM | DIASTOLIC BLOOD PRESSURE: 84 MMHG

## 2023-12-28 DIAGNOSIS — Z98.890 OTHER SPECIFIED POSTPROCEDURAL STATES: Chronic | ICD-10-CM

## 2023-12-28 DIAGNOSIS — D06.9 CARCINOMA IN SITU OF CERVIX, UNSPECIFIED: ICD-10-CM

## 2023-12-28 LAB
GLUCOSE BLDC GLUCOMTR-MCNC: 79 MG/DL — SIGNIFICANT CHANGE UP (ref 70–99)
GLUCOSE BLDC GLUCOMTR-MCNC: 79 MG/DL — SIGNIFICANT CHANGE UP (ref 70–99)
HCG UR QL: NEGATIVE — SIGNIFICANT CHANGE UP
HCG UR QL: NEGATIVE — SIGNIFICANT CHANGE UP

## 2023-12-28 PROCEDURE — 58571 TLH W/T/O 250 G OR LESS: CPT

## 2023-12-28 PROCEDURE — 88112 CYTOPATH CELL ENHANCE TECH: CPT | Mod: 26

## 2023-12-28 PROCEDURE — 52000 CYSTOURETHROSCOPY: CPT | Mod: 59

## 2023-12-28 PROCEDURE — 88309 TISSUE EXAM BY PATHOLOGIST: CPT | Mod: 26

## 2023-12-28 PROCEDURE — 57800 DILATION OF CERVICAL CANAL: CPT | Mod: 59

## 2023-12-28 PROCEDURE — S2900 ROBOTIC SURGICAL SYSTEM: CPT | Mod: NC

## 2023-12-28 DEVICE — VISTASEAL FIBRIN HUMAN 10ML: Type: IMPLANTABLE DEVICE | Status: FUNCTIONAL

## 2023-12-28 RX ORDER — ACETAMINOPHEN 500 MG
3 TABLET ORAL
Qty: 0 | Refills: 0 | DISCHARGE

## 2023-12-28 RX ORDER — HYDROMORPHONE HYDROCHLORIDE 2 MG/ML
1 INJECTION INTRAMUSCULAR; INTRAVENOUS; SUBCUTANEOUS
Refills: 0 | Status: DISCONTINUED | OUTPATIENT
Start: 2023-12-28 | End: 2023-12-28

## 2023-12-28 RX ORDER — HYDROMORPHONE HYDROCHLORIDE 2 MG/ML
0.5 INJECTION INTRAMUSCULAR; INTRAVENOUS; SUBCUTANEOUS
Refills: 0 | Status: DISCONTINUED | OUTPATIENT
Start: 2023-12-28 | End: 2023-12-28

## 2023-12-28 RX ORDER — SODIUM CHLORIDE 9 MG/ML
1000 INJECTION, SOLUTION INTRAVENOUS
Refills: 0 | Status: DISCONTINUED | OUTPATIENT
Start: 2023-12-28 | End: 2024-01-11

## 2023-12-28 RX ORDER — KETOROLAC TROMETHAMINE 30 MG/ML
30 SYRINGE (ML) INJECTION ONCE
Refills: 0 | Status: DISCONTINUED | OUTPATIENT
Start: 2023-12-28 | End: 2023-12-28

## 2023-12-28 RX ORDER — OXYCODONE HYDROCHLORIDE 5 MG/1
1 TABLET ORAL
Qty: 15 | Refills: 0
Start: 2023-12-28

## 2023-12-28 RX ORDER — SIMETHICONE 80 MG/1
80 TABLET, CHEWABLE ORAL ONCE
Refills: 0 | Status: COMPLETED | OUTPATIENT
Start: 2023-12-28 | End: 2023-12-28

## 2023-12-28 RX ORDER — OXYCODONE HYDROCHLORIDE 5 MG/1
5 TABLET ORAL ONCE
Refills: 0 | Status: DISCONTINUED | OUTPATIENT
Start: 2023-12-28 | End: 2023-12-28

## 2023-12-28 RX ORDER — ONDANSETRON 8 MG/1
4 TABLET, FILM COATED ORAL ONCE
Refills: 0 | Status: DISCONTINUED | OUTPATIENT
Start: 2023-12-28 | End: 2024-01-11

## 2023-12-28 RX ORDER — IBUPROFEN 200 MG
3 TABLET ORAL
Qty: 0 | Refills: 0 | DISCHARGE

## 2023-12-28 RX ORDER — RIMEGEPANT SULFATE 75 MG/75MG
1 TABLET, ORALLY DISINTEGRATING ORAL
Refills: 0 | DISCHARGE

## 2023-12-28 RX ADMIN — SIMETHICONE 80 MILLIGRAM(S): 80 TABLET, CHEWABLE ORAL at 12:37

## 2023-12-28 RX ADMIN — HYDROMORPHONE HYDROCHLORIDE 1 MILLIGRAM(S): 2 INJECTION INTRAMUSCULAR; INTRAVENOUS; SUBCUTANEOUS at 11:56

## 2023-12-28 RX ADMIN — HYDROMORPHONE HYDROCHLORIDE 0.5 MILLIGRAM(S): 2 INJECTION INTRAMUSCULAR; INTRAVENOUS; SUBCUTANEOUS at 11:30

## 2023-12-28 RX ADMIN — HYDROMORPHONE HYDROCHLORIDE 0.5 MILLIGRAM(S): 2 INJECTION INTRAMUSCULAR; INTRAVENOUS; SUBCUTANEOUS at 11:18

## 2023-12-28 RX ADMIN — HYDROMORPHONE HYDROCHLORIDE 1 MILLIGRAM(S): 2 INJECTION INTRAMUSCULAR; INTRAVENOUS; SUBCUTANEOUS at 11:33

## 2023-12-28 RX ADMIN — OXYCODONE HYDROCHLORIDE 5 MILLIGRAM(S): 5 TABLET ORAL at 12:37

## 2023-12-28 RX ADMIN — Medication 30 MILLIGRAM(S): at 13:18

## 2023-12-28 NOTE — ASU DISCHARGE PLAN (ADULT/PEDIATRIC) - PROVIDER TOKENS
PROVIDER:[TOKEN:[[id number]],SCHEDULEDAPPT:[01/12/2024],SCHEDULEDAPPTTIME:[10:00 AM]] PROVIDER:[TOKEN:[[id number]],SCHEDULEDAPPT:[01/12/2024],SCHEDULEDAPPTTIME:[10:00 AM]]

## 2023-12-28 NOTE — ASU DISCHARGE PLAN (ADULT/PEDIATRIC) - FOLLOW UP APPOINTMENTS
may also call Recovery Room (PACU) 24/7 @ [phone] may also call Recovery Room (PACU) 24/7 @ [phone]

## 2023-12-28 NOTE — ASU DISCHARGE PLAN (ADULT/PEDIATRIC) - CARE PROVIDER_API CALL
Shereen Joshi  Gynecologic Oncology  [address] [place] [id number]  Phone: [phone]  Fax: [phone]  Scheduled Appointment: 01/12/2024 10:00 AM   Shereen Joshi  Gynecologic Oncology  [address] [place] [id number]  Phone: [phone]  Fax: [phone]  Scheduled Appointment: 01/12/2024 10:00 AM

## 2023-12-28 NOTE — ASU DISCHARGE PLAN (ADULT/PEDIATRIC) - ASU DC SPECIAL INSTRUCTIONSFT
Postoperative Instructions    For pain control, take the followin. Ibuprofen 600mg every 6 hours, take with food  2. Add Tyelnol 975mg every 6 hours, alternated with ibuprofen  Tylenol and ibuprofen may be obtained over the counter.  3. Oxycodone 5mg every 6 hours as needed for severe pain. A prescription has been sent to your pharmacy.    Return to your regular way of eating.     Resume normal activity as tolerated, but no heavy lifting or strenuous activity for 6 weeks. Complete vaginal rest, no tampons, no douching, no tub bathing, no sexual activities for 6 weeks unless otherwise instructed by your doctor.      No driving while on narcotic pain medication.      Call your doctor with any signs and symptoms of infection such as fever (>100.4 F), chills, nausea or vomiting.  Call your doctor if you're unable to tolerate food or have difficulty urinating.  Call your doctor if you have pain that is not relieved by your prescribed medications. Call your doctor with redness or swelling at the incision site, fluid leakage or wound separation.    Notify your doctor with any other concerns. Follow up with Dr. Joshi on 24 at 10am for a post-operative appointment.

## 2023-12-28 NOTE — BRIEF OPERATIVE NOTE - NSICDXBRIEFPROCEDURE_GEN_ALL_CORE_FT
PROCEDURES:  Robot-assisted laparoscopic total hysterectomy with bilateral salpingectomy and cystoscopy using da Maik Xi 28-Dec-2023 11:04:44  Sarah Chirinos

## 2023-12-28 NOTE — ASU DISCHARGE PLAN (ADULT/PEDIATRIC) - NS MD DC FALL RISK RISK
For information on Fall & Injury Prevention, visit: [email or web address] OR  [email or web address] OR  https://www.cdc.gov/steadi/patient.html For information on Fall & Injury Prevention, visit: [email or web address] OR  [email or web address] OR  https://www.cdc.gov/steadi/patient.html

## 2023-12-28 NOTE — BRIEF OPERATIVE NOTE - OPERATION/FINDINGS
EUA revealed normal external genitalia, cervix s/p cone, ~8 week size anteverted uterus, no adnexal fullness. LSC survey of the abdomen revealed atraumatic entry site, grossly normal liver edge, stomach, and bowel. LSC survey of the pelvis revealed grossly normal uterus, b/l fallopian tubes, and ovaries. B/l fimbriae with mild adhesive disease noted. Cystoscopy at end of case revealed intact bladder and efflux from b/l UO. EUA revealed normal external genitalia, cervix s/p cone, ~8 week size anteverted uterus, no adnexal fullness. LSC survey of the abdomen revealed atraumatic entry site, grossly normal liver edge, stomach, and bowel. LSC survey of the pelvis revealed grossly normal uterus, b/l fallopian tubes, and ovaries. B/l fimbriae with mild adhesive disease noted. Cystoscopy at end of case revealed intact bladder and efflux from b/l UO. Vaginal cuff intact and rectal exam wnl at end of case. Excellent hemostasis noted.

## 2023-12-28 NOTE — CHART NOTE - NSCHARTNOTEFT_GEN_A_CORE
GYNECOLOGIC ONCOLOGY PA POST OP  NOTE    Patient seen and examined at bedside earlier in PACU. Patient s/p Toradol, Dilaudid and oxy x 1 earlier for abdominal pain which has since resolved. Patient denies headache, dizziness, nausea, vomiting, chest pain, palpitations and sob.  Patient tolerating regular diet. Patient ambulated to bathroom, voided 300cc.     OBJECTIVE:     VITALS:  T(F): 98.4 (12-28-23 @ 13:30), Max: 98.4 (12-28-23 @ 13:30)  HR: 85 (12-28-23 @ 13:30) (74 - 97)  BP: 115/76 (12-28-23 @ 13:30) (106/66 - 131/77)  RR: 15 (12-28-23 @ 13:30) (11 - 20)  SpO2: 99% (12-28-23 @ 13:30) (97% - 100%)  Wt(kg): --    I&O's Summary    28 Dec 2023 07:01  -  28 Dec 2023 15:42  --------------------------------------------------------  IN: 490 mL / OUT: 300 mL / NET: 190 mL        MEDICATIONS  (STANDING):  chlorhexidine 2% Cloths 1 Application(s) Topical daily  lactated ringers. 1000 milliLiter(s) (125 mL/Hr) IV Continuous <Continuous>  sodium chloride 0.9% lock flush 3 milliLiter(s) IV Push every 8 hours    MEDICATIONS  (PRN):  HYDROmorphone  Injectable 0.5 milliGRAM(s) IV Push every 10 minutes PRN Moderate Pain (4 - 6)  ondansetron Injectable 4 milliGRAM(s) IV Push once PRN Nausea and/or Vomiting      Physical Exam:  Constitutional: NAD  Pulmonary: clear to auscultation bilaterally   Cardiovascular: Regular rate and rhythm   Abdomen: soft, non-distended, appropriately tender, scope sites C/D/I  Extremities: no lower extremity edema or calve tenderness      Assessment/Plan:  32 yo female s/p Robot-assisted, total laparoscopic hysterectomy with bilateral salpingectomy and cystoscopy in stable condition. See operative note for details. Patient is meeting all PACU milestones for discharge home. Discharge instructions reviewed. Follow up with Dr. Blanco in 2 weeks.     Titi Melendez PA-C  #[id number] GYNECOLOGIC ONCOLOGY PA POST OP  NOTE    Patient seen and examined at bedside earlier in PACU. Patient s/p Toradol, Dilaudid and oxy x 1 earlier for abdominal pain which has since resolved. Patient denies headache, dizziness, nausea, vomiting, chest pain, palpitations and sob.  Patient tolerating regular diet. Patient ambulated to bathroom, voided 300cc.     OBJECTIVE:     VITALS:  T(F): 98.4 (12-28-23 @ 13:30), Max: 98.4 (12-28-23 @ 13:30)  HR: 85 (12-28-23 @ 13:30) (74 - 97)  BP: 115/76 (12-28-23 @ 13:30) (106/66 - 131/77)  RR: 15 (12-28-23 @ 13:30) (11 - 20)  SpO2: 99% (12-28-23 @ 13:30) (97% - 100%)  Wt(kg): --    I&O's Summary    28 Dec 2023 07:01  -  28 Dec 2023 15:42  --------------------------------------------------------  IN: 490 mL / OUT: 300 mL / NET: 190 mL        MEDICATIONS  (STANDING):  chlorhexidine 2% Cloths 1 Application(s) Topical daily  lactated ringers. 1000 milliLiter(s) (125 mL/Hr) IV Continuous <Continuous>  sodium chloride 0.9% lock flush 3 milliLiter(s) IV Push every 8 hours    MEDICATIONS  (PRN):  HYDROmorphone  Injectable 0.5 milliGRAM(s) IV Push every 10 minutes PRN Moderate Pain (4 - 6)  ondansetron Injectable 4 milliGRAM(s) IV Push once PRN Nausea and/or Vomiting      Physical Exam:  Constitutional: NAD  Pulmonary: clear to auscultation bilaterally   Cardiovascular: Regular rate and rhythm   Abdomen: soft, non-distended, appropriately tender, scope sites C/D/I  Extremities: no lower extremity edema or calve tenderness      Assessment/Plan:  34 yo female s/p Robot-assisted, total laparoscopic hysterectomy with bilateral salpingectomy and cystoscopy in stable condition. See operative note for details. Patient is meeting all PACU milestones for discharge home. Discharge instructions reviewed. Follow up with Dr. Blanco in 2 weeks.     Titi Melendez PA-C  #[id number]

## 2023-12-28 NOTE — ASU DISCHARGE PLAN (ADULT/PEDIATRIC) - NURSING INSTRUCTIONS
You were given 1000mg IV Tylenol for pain management.  Please DO NOT take any Tylenol containing products, such as  Vicodin, Percocet, Excedrin, many cold preparations for the next 6 hours (until 4PM).  DO NOT EXCEED 4000MG OF TYLENOL OVER 24 HOURS.     DO NOT take any Ibuprofen ,Advil or Aleeve until after 6:15 PM . You received Toradol during your operation and it can cause damage if too much is taken within a 24 hour time period.

## 2023-12-29 LAB
NON-GYNECOLOGICAL CYTOLOGY STUDY: SIGNIFICANT CHANGE UP
NON-GYNECOLOGICAL CYTOLOGY STUDY: SIGNIFICANT CHANGE UP

## 2023-12-30 ENCOUNTER — NON-APPOINTMENT (OUTPATIENT)
Age: 33
End: 2023-12-30

## 2024-01-03 ENCOUNTER — NON-APPOINTMENT (OUTPATIENT)
Age: 34
End: 2024-01-03

## 2024-01-16 ENCOUNTER — APPOINTMENT (OUTPATIENT)
Dept: GYNECOLOGIC ONCOLOGY | Facility: CLINIC | Age: 34
End: 2024-01-16
Payer: COMMERCIAL

## 2024-01-16 VITALS
WEIGHT: 161 LBS | HEIGHT: 66 IN | BODY MASS INDEX: 25.88 KG/M2 | DIASTOLIC BLOOD PRESSURE: 74 MMHG | HEART RATE: 83 BPM | TEMPERATURE: 97.4 F | SYSTOLIC BLOOD PRESSURE: 109 MMHG

## 2024-01-16 PROBLEM — N76.0 ACUTE VAGINITIS: Chronic | Status: ACTIVE | Noted: 2023-12-22

## 2024-01-16 PROBLEM — C53.9 MALIGNANT NEOPLASM OF CERVIX UTERI, UNSPECIFIED: Chronic | Status: ACTIVE | Noted: 2023-12-21

## 2024-01-16 PROBLEM — D64.9 ANEMIA, UNSPECIFIED: Chronic | Status: ACTIVE | Noted: 2023-12-22

## 2024-01-16 PROBLEM — Z92.89 PERSONAL HISTORY OF OTHER MEDICAL TREATMENT: Chronic | Status: ACTIVE | Noted: 2023-12-22

## 2024-01-16 LAB — SURGICAL PATHOLOGY STUDY: SIGNIFICANT CHANGE UP

## 2024-01-16 PROCEDURE — 99024 POSTOP FOLLOW-UP VISIT: CPT

## 2024-01-16 NOTE — REASON FOR VISIT
[Post Op] : post op visit [de-identified] : 12/28/2023 [de-identified] : Robotic-assisted total laparoscopic hysterectomy, bilateral salpingectomy,  cystoscopy, and bilateral transversus abdominus plain block [de-identified] : Denies f/c/n/v/d/vaginal bleeding.  Overall feels well.  Meeting milestones of recovery.  Regular BM and voiding freely. Final pathology reviewed in detail.   Final path:  Washings: negative 1. 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

## 2024-01-16 NOTE — ASSESSMENT
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  - Patients history and work up to date reviewed in detail. - Doing well, meeting all postoperative milestones. - Postoperative recovery and expectations reviewed again in detail. - Final pathology report reviewed in detail. - We discussed the rationale for my recommendation to perform vaginal fornix testing for high-risk (ie, carcinogenic or cancer associated) human papillomavirus (HPV) types annually for three years after hysterectomy. After three consecutive negative tests, HPV-based testing is performed at 3-year intervals for at least 25 years. If the patient remains in good health, testing can continue beyond the age of 65. - I spent the time noted on the day of this patient encounter preparing for, providing and documenting the above service. I have counseled and educated the patient on the differential, workup, disease course, and treatment/management plan. Education was provided to the patient during this encounter. All questions and concerns were answered and addressed in detail.

## 2024-01-16 NOTE — DISCUSSION/SUMMARY
[Clean] : was clean [Dry] : was dry [Intact] : was intact [None] : had no drainage [Normal Skin] : normal appearance [Normal Skin Turgor] : normal skin turgor [Doing Well] : is doing well [Excellent Pain Control] : has excellent pain control [No Sign of Infection] : is showing no signs of infection [Erythema] : was not erythematous [Ecchymosis] : was not ecchymotic [Firm] : soft [Tender] : nontender [Abnormal Bowel Sounds] : normal bowel sounds [Vaginal Exam Abnormal] : normal vaginal exam [de-identified] : vaginal cuff intact and well healed

## 2024-01-16 NOTE — LETTER BODY
[Dear  ___] : Dear  [unfilled], [I recently saw our patient [unfilled] for a follow-up visit.] : I recently saw our patient, [unfilled] for a follow-up visit. [Attached please find my note.] : Attached please find my note. [FreeTextEntry2] : Patient is status post Robotic-assisted total laparoscopic hysterectomy, bilateral salpingectomy,  cystoscopy, and bilateral transversus abdominus plain block on 12/28/2023 [FreeTextEntry1] : Final pathology

## 2024-02-29 ENCOUNTER — NON-APPOINTMENT (OUTPATIENT)
Age: 34
End: 2024-02-29

## 2024-05-14 ENCOUNTER — NON-APPOINTMENT (OUTPATIENT)
Age: 34
End: 2024-05-14

## 2024-05-17 ENCOUNTER — APPOINTMENT (OUTPATIENT)
Dept: OBGYN | Facility: CLINIC | Age: 34
End: 2024-05-17

## 2024-05-18 ENCOUNTER — EMERGENCY (EMERGENCY)
Facility: HOSPITAL | Age: 34
LOS: 1 days | Discharge: HOME | End: 2024-05-18
Admitting: PERSONAL EMERGENCY RESPONSE ATTENDANT
Payer: COMMERCIAL

## 2024-05-18 VITALS
OXYGEN SATURATION: 95 % | DIASTOLIC BLOOD PRESSURE: 70 MMHG | HEART RATE: 76 BPM | SYSTOLIC BLOOD PRESSURE: 108 MMHG | TEMPERATURE: 99 F | RESPIRATION RATE: 18 BRPM

## 2024-05-18 DIAGNOSIS — Z98.890 OTHER SPECIFIED POSTPROCEDURAL STATES: Chronic | ICD-10-CM

## 2024-05-18 LAB
ALBUMIN SERPL ELPH-MCNC: 3.3 G/DL — SIGNIFICANT CHANGE UP (ref 3.3–5)
ALP SERPL-CCNC: 52 U/L — SIGNIFICANT CHANGE UP (ref 40–120)
ALT FLD-CCNC: 24 U/L — SIGNIFICANT CHANGE UP (ref 4–33)
ANION GAP SERPL CALC-SCNC: 11 MMOL/L — SIGNIFICANT CHANGE UP (ref 7–14)
AST SERPL-CCNC: 25 U/L — SIGNIFICANT CHANGE UP (ref 4–32)
BASOPHILS # BLD AUTO: 0.02 K/UL — SIGNIFICANT CHANGE UP (ref 0–0.2)
BASOPHILS NFR BLD AUTO: 0.2 % — SIGNIFICANT CHANGE UP (ref 0–2)
BILIRUB SERPL-MCNC: 0.4 MG/DL — SIGNIFICANT CHANGE UP (ref 0.2–1.2)
BUN SERPL-MCNC: 8 MG/DL — SIGNIFICANT CHANGE UP (ref 7–23)
CALCIUM SERPL-MCNC: 8.2 MG/DL — LOW (ref 8.4–10.5)
CHLORIDE SERPL-SCNC: 105 MMOL/L — SIGNIFICANT CHANGE UP (ref 98–107)
CO2 SERPL-SCNC: 18 MMOL/L — LOW (ref 22–31)
CREAT SERPL-MCNC: 0.68 MG/DL — SIGNIFICANT CHANGE UP (ref 0.5–1.3)
EGFR: 119 ML/MIN/1.73M2 — SIGNIFICANT CHANGE UP
EOSINOPHIL # BLD AUTO: 0.02 K/UL — SIGNIFICANT CHANGE UP (ref 0–0.5)
EOSINOPHIL NFR BLD AUTO: 0.2 % — SIGNIFICANT CHANGE UP (ref 0–6)
GLUCOSE SERPL-MCNC: 92 MG/DL — SIGNIFICANT CHANGE UP (ref 70–99)
HCT VFR BLD CALC: 24.5 % — LOW (ref 34.5–45)
HGB BLD-MCNC: 8.1 G/DL — LOW (ref 11.5–15.5)
IANC: 4.46 K/UL — SIGNIFICANT CHANGE UP (ref 1.8–7.4)
IMM GRANULOCYTES NFR BLD AUTO: 0.6 % — SIGNIFICANT CHANGE UP (ref 0–0.9)
LYMPHOCYTES # BLD AUTO: 3.05 K/UL — SIGNIFICANT CHANGE UP (ref 1–3.3)
LYMPHOCYTES # BLD AUTO: 37 % — SIGNIFICANT CHANGE UP (ref 13–44)
MAGNESIUM SERPL-MCNC: 1.9 MG/DL — SIGNIFICANT CHANGE UP (ref 1.6–2.6)
MCHC RBC-ENTMCNC: 29.3 PG — SIGNIFICANT CHANGE UP (ref 27–34)
MCHC RBC-ENTMCNC: 33.1 GM/DL — SIGNIFICANT CHANGE UP (ref 32–36)
MCV RBC AUTO: 88.8 FL — SIGNIFICANT CHANGE UP (ref 80–100)
MONOCYTES # BLD AUTO: 0.64 K/UL — SIGNIFICANT CHANGE UP (ref 0–0.9)
MONOCYTES NFR BLD AUTO: 7.8 % — SIGNIFICANT CHANGE UP (ref 2–14)
NEUTROPHILS # BLD AUTO: 4.46 K/UL — SIGNIFICANT CHANGE UP (ref 1.8–7.4)
NEUTROPHILS NFR BLD AUTO: 54.2 % — SIGNIFICANT CHANGE UP (ref 43–77)
NRBC # BLD: 0 /100 WBCS — SIGNIFICANT CHANGE UP (ref 0–0)
NRBC # FLD: 0 K/UL — SIGNIFICANT CHANGE UP (ref 0–0)
PHOSPHATE SERPL-MCNC: 3.3 MG/DL — SIGNIFICANT CHANGE UP (ref 2.5–4.5)
PLATELET # BLD AUTO: 203 K/UL — SIGNIFICANT CHANGE UP (ref 150–400)
POTASSIUM SERPL-MCNC: 4.6 MMOL/L — SIGNIFICANT CHANGE UP (ref 3.5–5.3)
POTASSIUM SERPL-SCNC: 4.6 MMOL/L — SIGNIFICANT CHANGE UP (ref 3.5–5.3)
PROT SERPL-MCNC: 6.5 G/DL — SIGNIFICANT CHANGE UP (ref 6–8.3)
RBC # BLD: 2.76 M/UL — LOW (ref 3.8–5.2)
RBC # FLD: 13.7 % — SIGNIFICANT CHANGE UP (ref 10.3–14.5)
SODIUM SERPL-SCNC: 134 MMOL/L — LOW (ref 135–145)
WBC # BLD: 8.24 K/UL — SIGNIFICANT CHANGE UP (ref 3.8–10.5)
WBC # FLD AUTO: 8.24 K/UL — SIGNIFICANT CHANGE UP (ref 3.8–10.5)

## 2024-05-18 PROCEDURE — 99284 EMERGENCY DEPT VISIT MOD MDM: CPT

## 2024-05-18 RX ORDER — KETOROLAC TROMETHAMINE 30 MG/ML
15 SYRINGE (ML) INJECTION ONCE
Refills: 0 | Status: DISCONTINUED | OUTPATIENT
Start: 2024-05-18 | End: 2024-05-18

## 2024-05-18 RX ORDER — SODIUM CHLORIDE 9 MG/ML
1000 INJECTION INTRAMUSCULAR; INTRAVENOUS; SUBCUTANEOUS ONCE
Refills: 0 | Status: COMPLETED | OUTPATIENT
Start: 2024-05-18 | End: 2024-05-18

## 2024-05-18 RX ORDER — DIPHENHYDRAMINE HCL 50 MG
25 CAPSULE ORAL ONCE
Refills: 0 | Status: COMPLETED | OUTPATIENT
Start: 2024-05-18 | End: 2024-05-18

## 2024-05-18 RX ORDER — DIPHENHYDRAMINE HCL 50 MG
25 CAPSULE ORAL ONCE
Refills: 0 | Status: DISCONTINUED | OUTPATIENT
Start: 2024-05-18 | End: 2024-05-18

## 2024-05-18 RX ORDER — METOCLOPRAMIDE HCL 10 MG
10 TABLET ORAL ONCE
Refills: 0 | Status: COMPLETED | OUTPATIENT
Start: 2024-05-18 | End: 2024-05-18

## 2024-05-18 RX ADMIN — Medication 10 MILLIGRAM(S): at 22:23

## 2024-05-18 RX ADMIN — Medication 15 MILLIGRAM(S): at 22:23

## 2024-05-18 RX ADMIN — SODIUM CHLORIDE 1000 MILLILITER(S): 9 INJECTION INTRAMUSCULAR; INTRAVENOUS; SUBCUTANEOUS at 22:23

## 2024-05-18 RX ADMIN — Medication 25 MILLIGRAM(S): at 22:41

## 2024-05-18 NOTE — ED ADULT NURSE NOTE - OBJECTIVE STATEMENT
Patient received in room 8. Patient is AOx4, c/o 10/10 headache and eye pain. Patient states she has a medical history of anemia and migraines. Patient states she had a hysterectomy in the past. Patient appears uncomfortable on stretcher. Endorses the light bothers her eyes. She has associated dizziness with her headaches. Denies SOB, chest pain or numbness/tingling in extremities. BEFAST negative. 20G placed in RAC; labs drawn and sent. Patient medicated per chart. Lights turned off for comfort. Bed at lowest position. Safety Maintained.

## 2024-05-18 NOTE — ED ADULT NURSE NOTE - CHIEF COMPLAINT QUOTE
Pt c/o headache x1 day. Denies vision changes, vomiting. Neuro intact. Had liposuction performed yesterday in [place]. Hx of migraines, anemia

## 2024-05-18 NOTE — ED PROVIDER NOTE - OBJECTIVE STATEMENT
34 yo female with PMHx of anemia, carcinoma in situ of the cervix and migraines presents to the ED with complaints of headache. Patient is 24 hrs s/p liposuction of the back, abdomen and inner thighs in [place], and flew back today. She states "when I had my BBL, I was admitted after it for a blood transfusion due to low HgB" Patient endorses frontal HA, denies SOB, CP or dizziness.

## 2024-05-18 NOTE — ED ADULT NURSE NOTE - NSFALLUNIVINTERV_ED_ALL_ED
Bed/Stretcher in lowest position, wheels locked, appropriate side rails in place/Call bell, personal items and telephone in reach/Instruct patient to call for assistance before getting out of bed/chair/stretcher/Non-slip footwear applied when patient is off stretcher/[place] to call system/Physically safe environment - no spills, clutter or unnecessary equipment/Purposeful proactive rounding/Room/bathroom lighting operational, light cord in reach

## 2024-05-18 NOTE — ED ADULT TRIAGE NOTE - CHIEF COMPLAINT QUOTE
Pt c/o headache x1 day. Denies vision changes, vomiting. Had liposuction performed yesterday in [place]. Hx of migraines. Pt c/o headache x1 day. Denies vision changes, vomiting. Neuro intact. Had liposuction performed yesterday in [place]. Hx of migraines. Pt c/o headache x1 day. Denies vision changes, vomiting. Neuro intact. Had liposuction performed yesterday in [place]. Hx of migraines, anemia

## 2024-05-18 NOTE — ED PROVIDER NOTE - PATIENT PORTAL LINK FT
You can access the FollowMyHealth Patient Portal offered by [facility] by registering at the following website: [email or web address]. By joining [facility]’s FollowMyHealth portal, you will also be able to view your health information using other applications (apps) compatible with our system.

## 2024-05-18 NOTE — ED PROVIDER NOTE - CLINICAL SUMMARY MEDICAL DECISION MAKING FREE TEXT BOX
34 yo female with Headache s/p liposuction yesterday in [place], patient concerned that the headache is due to traveling too soon after surgery, she also states PMHx of anemia and blood transfusions    Plan: pain control, fluids possible transfusion if HgB is below 6.0

## 2024-05-21 ENCOUNTER — EMERGENCY (EMERGENCY)
Facility: HOSPITAL | Age: 34
LOS: 1 days | Discharge: HOME | End: 2024-05-21
Admitting: EMERGENCY MEDICINE
Payer: COMMERCIAL

## 2024-05-21 VITALS
DIASTOLIC BLOOD PRESSURE: 86 MMHG | RESPIRATION RATE: 18 BRPM | SYSTOLIC BLOOD PRESSURE: 127 MMHG | HEART RATE: 72 BPM | OXYGEN SATURATION: 100 % | TEMPERATURE: 98 F

## 2024-05-21 DIAGNOSIS — Z98.890 OTHER SPECIFIED POSTPROCEDURAL STATES: Chronic | ICD-10-CM

## 2024-05-21 PROCEDURE — 93010 ELECTROCARDIOGRAM REPORT: CPT

## 2024-05-21 PROCEDURE — 99285 EMERGENCY DEPT VISIT HI MDM: CPT

## 2024-05-21 PROCEDURE — 99053 MED SERV 10PM-8AM 24 HR FAC: CPT

## 2024-05-21 NOTE — ED ADULT TRIAGE NOTE - CHIEF COMPLAINT QUOTE
patient c/o dizziness and headache since Saturday. Was d/c from ED Saturday, but states she needs a transfusion her hemoglobin is 8. Patient denies chest pain sob. PHX anemia, hysterectomy.

## 2024-05-22 VITALS
HEART RATE: 105 BPM | TEMPERATURE: 98 F | OXYGEN SATURATION: 100 % | RESPIRATION RATE: 16 BRPM | DIASTOLIC BLOOD PRESSURE: 75 MMHG | SYSTOLIC BLOOD PRESSURE: 112 MMHG

## 2024-05-22 LAB
ADD ON TEST-SPECIMEN IN LAB: SIGNIFICANT CHANGE UP
ALBUMIN SERPL ELPH-MCNC: 3.5 G/DL — SIGNIFICANT CHANGE UP (ref 3.3–5)
ALP SERPL-CCNC: 61 U/L — SIGNIFICANT CHANGE UP (ref 40–120)
ALT FLD-CCNC: 23 U/L — SIGNIFICANT CHANGE UP (ref 4–33)
ANION GAP SERPL CALC-SCNC: 12 MMOL/L — SIGNIFICANT CHANGE UP (ref 7–14)
ANISOCYTOSIS BLD QL: SLIGHT — SIGNIFICANT CHANGE UP
APTT BLD: 28.4 SEC — SIGNIFICANT CHANGE UP (ref 24.5–35.6)
AST SERPL-CCNC: 18 U/L — SIGNIFICANT CHANGE UP (ref 4–32)
BASOPHILS # BLD AUTO: 0.1 K/UL — SIGNIFICANT CHANGE UP (ref 0–0.2)
BASOPHILS NFR BLD AUTO: 0.9 % — SIGNIFICANT CHANGE UP (ref 0–2)
BILIRUB SERPL-MCNC: 1.1 MG/DL — SIGNIFICANT CHANGE UP (ref 0.2–1.2)
BLD GP AB SCN SERPL QL: NEGATIVE — SIGNIFICANT CHANGE UP
BUN SERPL-MCNC: 7 MG/DL — SIGNIFICANT CHANGE UP (ref 7–23)
CALCIUM SERPL-MCNC: 8.9 MG/DL — SIGNIFICANT CHANGE UP (ref 8.4–10.5)
CHLORIDE SERPL-SCNC: 102 MMOL/L — SIGNIFICANT CHANGE UP (ref 98–107)
CO2 SERPL-SCNC: 23 MMOL/L — SIGNIFICANT CHANGE UP (ref 22–31)
CREAT SERPL-MCNC: 0.73 MG/DL — SIGNIFICANT CHANGE UP (ref 0.5–1.3)
EGFR: 113 ML/MIN/1.73M2 — SIGNIFICANT CHANGE UP
EOSINOPHIL # BLD AUTO: 0.18 K/UL — SIGNIFICANT CHANGE UP (ref 0–0.5)
EOSINOPHIL NFR BLD AUTO: 1.7 % — SIGNIFICANT CHANGE UP (ref 0–6)
GIANT PLATELETS BLD QL SMEAR: PRESENT — SIGNIFICANT CHANGE UP
GLUCOSE SERPL-MCNC: 94 MG/DL — SIGNIFICANT CHANGE UP (ref 70–99)
HCT VFR BLD CALC: 23.7 % — LOW (ref 34.5–45)
HCT VFR BLD CALC: 30.9 % — LOW (ref 34.5–45)
HGB BLD-MCNC: 7.5 G/DL — LOW (ref 11.5–15.5)
HGB BLD-MCNC: 9.8 G/DL — LOW (ref 11.5–15.5)
IANC: 5.69 K/UL — SIGNIFICANT CHANGE UP (ref 1.8–7.4)
INR BLD: 1.02 RATIO — SIGNIFICANT CHANGE UP (ref 0.85–1.18)
LYMPHOCYTES # BLD AUTO: 3.46 K/UL — HIGH (ref 1–3.3)
LYMPHOCYTES # BLD AUTO: 32.5 % — SIGNIFICANT CHANGE UP (ref 13–44)
MAGNESIUM SERPL-MCNC: 2.3 MG/DL — SIGNIFICANT CHANGE UP (ref 1.6–2.6)
MANUAL SMEAR VERIFICATION: SIGNIFICANT CHANGE UP
MCHC RBC-ENTMCNC: 28.5 PG — SIGNIFICANT CHANGE UP (ref 27–34)
MCHC RBC-ENTMCNC: 29 PG — SIGNIFICANT CHANGE UP (ref 27–34)
MCHC RBC-ENTMCNC: 31.6 GM/DL — LOW (ref 32–36)
MCHC RBC-ENTMCNC: 31.7 GM/DL — LOW (ref 32–36)
MCV RBC AUTO: 89.8 FL — SIGNIFICANT CHANGE UP (ref 80–100)
MCV RBC AUTO: 91.5 FL — SIGNIFICANT CHANGE UP (ref 80–100)
MONOCYTES # BLD AUTO: 0.84 K/UL — SIGNIFICANT CHANGE UP (ref 0–0.9)
MONOCYTES NFR BLD AUTO: 7.9 % — SIGNIFICANT CHANGE UP (ref 2–14)
NEUTROPHILS # BLD AUTO: 6.06 K/UL — SIGNIFICANT CHANGE UP (ref 1.8–7.4)
NEUTROPHILS NFR BLD AUTO: 57 % — SIGNIFICANT CHANGE UP (ref 43–77)
NRBC # BLD: 0 /100 WBCS — SIGNIFICANT CHANGE UP (ref 0–0)
NRBC # FLD: 0.03 K/UL — HIGH (ref 0–0)
PHOSPHATE SERPL-MCNC: 4.8 MG/DL — HIGH (ref 2.5–4.5)
PLAT MORPH BLD: NORMAL — SIGNIFICANT CHANGE UP
PLATELET # BLD AUTO: 307 K/UL — SIGNIFICANT CHANGE UP (ref 150–400)
PLATELET # BLD AUTO: 328 K/UL — SIGNIFICANT CHANGE UP (ref 150–400)
PLATELET COUNT - ESTIMATE: ABNORMAL
POLYCHROMASIA BLD QL SMEAR: SLIGHT — SIGNIFICANT CHANGE UP
POTASSIUM SERPL-MCNC: 4.8 MMOL/L — SIGNIFICANT CHANGE UP (ref 3.5–5.3)
POTASSIUM SERPL-SCNC: 4.8 MMOL/L — SIGNIFICANT CHANGE UP (ref 3.5–5.3)
PROT SERPL-MCNC: 7 G/DL — SIGNIFICANT CHANGE UP (ref 6–8.3)
PROTHROM AB SERPL-ACNC: 11.5 SEC — SIGNIFICANT CHANGE UP (ref 9.5–13)
RBC # BLD: 2.59 M/UL — LOW (ref 3.8–5.2)
RBC # BLD: 3.44 M/UL — LOW (ref 3.8–5.2)
RBC # FLD: 13.8 % — SIGNIFICANT CHANGE UP (ref 10.3–14.5)
RBC # FLD: 16 % — HIGH (ref 10.3–14.5)
RBC BLD AUTO: ABNORMAL
RH IG SCN BLD-IMP: POSITIVE — SIGNIFICANT CHANGE UP
SODIUM SERPL-SCNC: 137 MMOL/L — SIGNIFICANT CHANGE UP (ref 135–145)
WBC # BLD: 10 K/UL — SIGNIFICANT CHANGE UP (ref 3.8–10.5)
WBC # BLD: 10.64 K/UL — HIGH (ref 3.8–10.5)
WBC # FLD AUTO: 10 K/UL — SIGNIFICANT CHANGE UP (ref 3.8–10.5)
WBC # FLD AUTO: 10.64 K/UL — HIGH (ref 3.8–10.5)

## 2024-05-22 PROCEDURE — 99236 HOSP IP/OBS SAME DATE HI 85: CPT

## 2024-05-22 RX ORDER — METOCLOPRAMIDE HCL 10 MG
10 TABLET ORAL ONCE
Refills: 0 | Status: DISCONTINUED | OUTPATIENT
Start: 2024-05-22 | End: 2024-05-22

## 2024-05-22 RX ORDER — ACETAMINOPHEN 500 MG
975 TABLET ORAL ONCE
Refills: 0 | Status: COMPLETED | OUTPATIENT
Start: 2024-05-22 | End: 2024-05-22

## 2024-05-22 RX ORDER — SODIUM CHLORIDE 9 MG/ML
2000 INJECTION INTRAMUSCULAR; INTRAVENOUS; SUBCUTANEOUS ONCE
Refills: 0 | Status: COMPLETED | OUTPATIENT
Start: 2024-05-22 | End: 2024-05-22

## 2024-05-22 RX ADMIN — Medication 975 MILLIGRAM(S): at 04:41

## 2024-05-22 RX ADMIN — SODIUM CHLORIDE 2000 MILLILITER(S): 9 INJECTION INTRAMUSCULAR; INTRAVENOUS; SUBCUTANEOUS at 04:51

## 2024-05-22 RX ADMIN — Medication 975 MILLIGRAM(S): at 03:41

## 2024-05-22 NOTE — ED PROVIDER NOTE - GASTROINTESTINAL, MLM
Abdomen soft, non-tender, no guarding. incision  sites clean dry with no surrounding erythema/streaking/discharge. No flank or abdomen ecchymosis/hematoma

## 2024-05-22 NOTE — ED PROVIDER NOTE - ATTENDING APP SHARED VISIT CONTRIBUTION OF CARE
HPI: 34 y/o Female with history of anemia ( due to menorrhea in past but now s/p abdominoplasty,) carcinoma in situ of the cervix and migraines recent liposuction on Friday in [place] presenting with dizziness and headache x 4 days.    patient states she was here on Saturday for the symptoms saw that she had a hemoglobin of 8 and states she wants a blood transfusion which is why she came back.  Patient states overall the headache is consistent with her migraine headaches however the dizziness is new.  States she has been wearing her waist  as advised by her surgeon and has been feeling well with no pain or bruising.  headache improves with acetaminophen    Denies fevers, chills, abdominal pain, nausea, vomiting, diarrhea, constipation, melena, hematochezia, rectal pain or bleeding, hematuria, dysuria, pelvic pain, back pain, chest pain, shortness of breath, dyspnea on exertion, palpitations, lightheadedness, presyncope, syncope, change in vision, blurry vision, tinnitus, slurred speech, facial asymmetry, paresthesias, weakness, inability to walk    EXAM: NAD, eyes closed, speaking full sentences. abd soft.     MDM: pt with anemia and migraines here likely for migraines but given history of anemia and recent surgery Will obtain labs, provide IVF and meds and reassess.

## 2024-05-22 NOTE — ED PROVIDER NOTE - NSICDXPASTSURGICALHX_GEN_ALL_CORE_FT
PAST SURGICAL HISTORY:  H/O abdominoplasty     H/O abdominoplasty     H/O cone biopsy of cervix     
The patient has been re-examined and I agree with the above assessment or I updated with my findings.

## 2024-05-22 NOTE — ED CDU PROVIDER DISPOSITION NOTE - PATIENT PORTAL LINK FT
You can access the FollowMyHealth Patient Portal offered by [facility] by registering at the following website: [email or web address]. By joining [facility]’s FollowMyHealth portal, you will also be able to view your health information using other applications (apps) compatible with our system.

## 2024-05-22 NOTE — ED ADULT NURSE NOTE - OBJECTIVE STATEMENT
Pt received to intake room 2 a/o x 3 c/o headache, dizziness since Saturday and a syncopal episode today. Pt states she was seen here saturday for similar and DCed. Pt has hx of  anemia, migraine. Pt states she has sensitivity to light. Pt was told she her HGB was 8 the last time. pt denies hitting her head or any blood thinner use.  Respirations even and unlabored. Lung sounds clear with equal chest rise bilaterally. ABD is soft, non tender, non distended with normal active bowel sounds No complaints of chest pain,  nausea,  vomiting  SOB, fever, chills verbalized. 20g iv placed to left AC labs drawn and sent.

## 2024-05-22 NOTE — ED CDU PROVIDER DISPOSITION NOTE - ATTENDING CONTRIBUTION TO CARE
A & O x 3, NAD, HEENT WNL and no facial asymmetry; lungs CTAB, heart with reg rhythm without murmur; abdomen soft NTND; face symmetric    Patient symptomatically improved, ok for dc.

## 2024-05-22 NOTE — ED PROVIDER NOTE - CLINICAL SUMMARY MEDICAL DECISION MAKING FREE TEXT BOX
32 y/o Female with history of anemia, carcinoma in situ of the cervix and migraines recent liposuction on Friday in [place] presenting with dizziness and headache x 4 days. patient states she was here on Saturday for the symptoms saw that she had a hemoglobin of 8 and states she wants a blood transfusion which is why she came back.  Patient states overall the headache is consistent with her migraine headaches however the dizziness is new.  States she has been wearing her waist  as advised by her surgeon and has been feeling well with no pain or bruising.    #headache    # dizziness/anemia 32 y/o Female with history of anemia, carcinoma in situ of the cervix and migraines recent liposuction on Friday in [place] presenting with dizziness and headache x 4 days. patient states she was here on Saturday for the symptoms saw that she had a hemoglobin of 8 and states she wants a blood transfusion which is why she came back.  Patient states overall the headache is consistent with her migraine headaches however the dizziness is new.  States she has been wearing her waist  as advised by her surgeon and has been feeling well with no pain or bruising.    #headache    presents with Headache.    No focal neurological symptoms. Neuro exam is benign. Pt is nontoxic. VSS.    Based on history and normal neurological exam I have low suspicion for intracranial tumor, intracranial bleed, meningitis, temporal arteritis, glaucoma, CO poisoning.    Most likely patient has benign headache, recommend rest, hydration, and acetaminophen    # dizziness/anemia    Abdominal exam without peritoneal signs. No evidence of acute abdomen at this time. Well appearing. Low suspicion for acute hepatobiliary disease (including acute cholecystitis), acute pancreatitis, PUD (including perforation), acute infectious processes (pneumonia, hepatitis, pyelonephritis), acute appendicitis, vascular catastrophe, bowel obstruction or viscus perforation. Pt denies rectal/vaginal bleeding or dark/bloody stools. No abdominal imaging warranted. however given hx and prior anemia on last ED visit will obtain labs r/o anemia and transfuse if required.

## 2024-05-22 NOTE — ED ADULT NURSE NOTE - NSFALLHARMRISKINTERV_ED_ALL_ED

## 2024-05-22 NOTE — ED CDU PROVIDER INITIAL DAY NOTE - ATTENDING APP SHARED VISIT CONTRIBUTION OF CARE
HPI: 32 y/o Female with history of anemia ( due to menorrhea in past but now s/p abdominoplasty,) carcinoma in situ of the cervix and migraines recent liposuction on Friday in [place] presenting with dizziness and headache x 4 days. patient states she was here on Saturday for the symptoms saw that she had a hemoglobin of 8 and states she wants a blood transfusion which is why she came back.  Patient states overall the headache is consistent with her migraine headaches however the dizziness is new.  States she has been wearing her waist  as advised by her surgeon and has been feeling well with no pain or bruising.  headache improves with acetaminophen.    EXAM: NAD, eyes closed, speaking full sentences, abd soft.     MDM: 32 y/o Female with history of anemia ( due to menorrhea in past but now s/p abdominoplasty,) carcinoma in situ of the cervix and migraines recent liposuction on Friday in [place] presenting with dizziness and headache x 4 days. patient states she was here on Saturday for the symptoms saw that she had a hemoglobin of 8 and states she wants a blood transfusion which is why she came back.  Patient states overall the headache is consistent with her migraine headaches however the dizziness is new.  States she has been wearing her waist  as advised by her surgeon and has been feeling well with no pain or bruising.  headache improves with acetaminophen.    In ED: In ED headache improved with acetaminophen. Hemoglobin down trended from hgb/hct 8.1/24.5 (05/18/24) to 7.5/23.7.  Abdomen soft nontender with no hematoma or ecchymosis.  Patient denies any rectal/vaginal bleeding also denies melena or hematochezia.  Patient consented for blood transfusion ordered for 2 units packed red blood cells, and repeat labs post transfusion

## 2024-05-22 NOTE — ED CDU PROVIDER DISPOSITION NOTE - NSFOLLOWUPINSTRUCTIONS_ED_ALL_ED_FT
Follow up with your Primary Medical Doctor in 1-2 days.  Follow up with Hematology in 1-2 days see attached list.  Rest.  Stay well hydrated.  Return to the ER for any persistent/worsening weakness, dizziness, chest pain, shortness of breath or any concerning symptoms.

## 2024-05-22 NOTE — ED PROVIDER NOTE - PROGRESS NOTE DETAILS
WALTER Estes: Discussed with patient she has a drop in hemoglobin therefore blood transfusion consent obtained.  In addition to this patient evaluated by attending recommending to give 2 L IV fluids therefore ordered.  Discussed with patient observation unit disposition as planned to transfuse 2 units packed red blood cells.  Patient agreeable for CDU stay.

## 2024-05-22 NOTE — ED ADULT NURSE REASSESSMENT NOTE - NS ED NURSE REASSESS COMMENT FT1
Break RN: Patient resting in stretcher, at baseline mental status, no acute distress noted at this time. Respirations equal and unlabored. Pt on cardiac monitor, NSR noted. PRBC's started. Pt tolerating well. No signs of bleeding or reaction. Care plan continued. Comfort measures provided. Safety maintained. Awaiting repeat labs.

## 2024-05-22 NOTE — ED PROVIDER NOTE - OBJECTIVE STATEMENT
34 y/o Female with history of anemia, carcinoma in situ of the cervix and migraines recent liposucction on Friday in [place] presenting with dizziness and headache x 4 days.    patient states she was here on Saturday for the symptoms saw that she had a hemoglobin of 8 and states she wants a blood transfusion which is why she came back.  Patient states overall the headache is consistent with her migraine headaches however the dizziness is new.  States she has been wearing her waist  as advised by her surgeon and has been feeling well with no pain or bruising.  headache improves with acetaminophen    Denies fevers, chills, abdominal pain, nausea, vomiting, diarrhea, constipation, melena, hematochezia, rectal pain or bleeding, hematuria, dysuria, pelvic pain, back pain, chest pain, shortness of breath, dyspnea on exertion, palpitations, lightheadedness, presyncope, syncope, change in vision, blurry vision, tinnitus, slurred speech, facial asymmetry, paresthesias, weakness, inability to walk 32 y/o Female with history of anemia ( due to menorrhea in past but now s/p abdominoplasty,) carcinoma in situ of the cervix and migraines recent liposuction on Friday in [place] presenting with dizziness and headache x 4 days.    patient states she was here on Saturday for the symptoms saw that she had a hemoglobin of 8 and states she wants a blood transfusion which is why she came back.  Patient states overall the headache is consistent with her migraine headaches however the dizziness is new.  States she has been wearing her waist  as advised by her surgeon and has been feeling well with no pain or bruising.  headache improves with acetaminophen    Denies fevers, chills, abdominal pain, nausea, vomiting, diarrhea, constipation, melena, hematochezia, rectal pain or bleeding, hematuria, dysuria, pelvic pain, back pain, chest pain, shortness of breath, dyspnea on exertion, palpitations, lightheadedness, presyncope, syncope, change in vision, blurry vision, tinnitus, slurred speech, facial asymmetry, paresthesias, weakness, inability to walk

## 2024-05-22 NOTE — ED CDU PROVIDER INITIAL DAY NOTE - CLINICAL SUMMARY MEDICAL DECISION MAKING FREE TEXT BOX
32 y/o Female with history of anemia ( due to menorrhea in past but now s/p abdominoplasty,) carcinoma in situ of the cervix and migraines recent liposuction on Friday in [place] presenting with dizziness and headache x 4 days. patient states she was here on Saturday for the symptoms saw that she had a hemoglobin of 8 and states she wants a blood transfusion which is why she came back.  Patient states overall the headache is consistent with her migraine headaches however the dizziness is new.  States she has been wearing her waist  as advised by her surgeon and has been feeling well with no pain or bruising.  headache improves with acetaminophen.    In ED: In ED headache improved with acetaminophen. Hemoglobin down trended from hgb/hct 8.1/24.5 (05/18/24) to 7.5/23.7.  Abdomen soft nontender with no hematoma or ecchymosis.  Patient denies any rectal/vaginal bleeding also denies melena or hematochezia.  Patient consented for blood transfusion ordered for 2 units packed red blood cells, and repeat labs post transfusion

## 2024-05-22 NOTE — ED CDU PROVIDER DISPOSITION NOTE - CLINICAL COURSE
WALTER Castro:  33-year-old female with history of anemia, carcinoma in situ of cervix and migraines, status post liposuction 5 days ago in [place] presented to the emergency department complaining of dizziness and headache x 4 days.  Patient found to have low H&H patient placed in CDU for blood transfusion.  H&H improved to 9.8/30.9, patient feels improved, tolerating p.o., ambulating without difficulty, discharge and results reviewed with patient. WALTER Castro:  33-year-old female with history of anemia, carcinoma in situ of cervix and migraines, status post liposuction 5 days ago in [place] presented to the emergency department complaining of dizziness and headache x 4 days.  Patient found to have low H&H patient placed in CDU for blood transfusion.  H&H improved to 9.8/30.9, patient feels improved, pt wants to go home, tolerating p.o., ambulating without difficulty, discharge and results reviewed with patient. WALTER Castro:  33-year-old female with history of anemia, carcinoma in situ of cervix and migraines, status post liposuction 5 days ago in [place] presented to the emergency department complaining of dizziness and headache x 4 days.  Patient found to have low H&H patient placed in CDU for blood transfusion.  H&H improved to 9.8/30.9, patient feels improved, pt wants to go home, tolerating p.o., ambulating without difficulty, discharge and results reviewed with patient. Return precautions discussed.

## 2024-05-28 ENCOUNTER — APPOINTMENT (OUTPATIENT)
Dept: GYNECOLOGIC ONCOLOGY | Facility: CLINIC | Age: 34
End: 2024-05-28
Payer: COMMERCIAL

## 2024-05-28 PROCEDURE — 99212 OFFICE O/P EST SF 10 MIN: CPT
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
## 2024-05-28 NOTE — REASON FOR VISIT
[FreeTextEntry1] : Discussion regarding paperwork and clearance for an upcoming potential new position as a

## 2024-05-28 NOTE — ASSESSMENT
[FreeTextEntry1] : 34 yo with AIS and CIS on recent CKC with negative margins now s/p definitive surgical management, cleared from a postoperative standpoint.
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
## 2024-06-06 DIAGNOSIS — N76.0 ACUTE VAGINITIS: ICD-10-CM

## 2024-06-06 DIAGNOSIS — G43.829 MENSTRUAL MIGRAINE, NOT INTRACTABLE, W/OUT STATUS MIGRAINOSUS: ICD-10-CM

## 2024-06-06 DIAGNOSIS — N39.0 URINARY TRACT INFECTION, SITE NOT SPECIFIED: ICD-10-CM

## 2024-06-06 DIAGNOSIS — R87.610 ATYPICAL SQUAMOUS CELLS OF UNDETERMINED SIGNIFICANCE ON CYTOLOGIC SMEAR OF CERVIX (ASC-US): ICD-10-CM

## 2024-06-06 DIAGNOSIS — R87.619 UNSPECIFIED ABNORMAL CYTOLOGICAL FINDINGS IN SPECIMENS FROM CERVIX UTERI: ICD-10-CM

## 2024-06-06 DIAGNOSIS — L85.3 XEROSIS CUTIS: ICD-10-CM

## 2024-06-06 DIAGNOSIS — B96.89 ACUTE VAGINITIS: ICD-10-CM

## 2024-06-06 DIAGNOSIS — Z86.19 PERSONAL HISTORY OF OTHER INFECTIOUS AND PARASITIC DISEASES: ICD-10-CM

## 2024-06-06 DIAGNOSIS — Z48.89 ENCOUNTER FOR OTHER SPECIFIED SURGICAL AFTERCARE: ICD-10-CM

## 2024-06-06 DIAGNOSIS — Z87.42 PERSONAL HISTORY OF OTHER DISEASES OF THE FEMALE GENITAL TRACT: ICD-10-CM

## 2024-06-06 DIAGNOSIS — R21 RASH AND OTHER NONSPECIFIC SKIN ERUPTION: ICD-10-CM

## 2024-06-06 DIAGNOSIS — N89.8 OTHER SPECIFIED NONINFLAMMATORY DISORDERS OF VAGINA: ICD-10-CM

## 2024-06-06 DIAGNOSIS — R87.810 ATYPICAL SQUAMOUS CELLS OF UNDETERMINED SIGNIFICANCE ON CYTOLOGIC SMEAR OF CERVIX (ASC-US): ICD-10-CM

## 2024-06-06 DIAGNOSIS — Z86.001 PERSONAL HISTORY OF IN-SITU NEOPLASM OF CERVIX UTERI: ICD-10-CM

## 2024-06-06 DIAGNOSIS — L90.6 STRIAE ATROPHICAE: ICD-10-CM

## 2024-06-24 ENCOUNTER — NON-APPOINTMENT (OUTPATIENT)
Age: 34
End: 2024-06-24

## 2024-07-15 PROBLEM — D09.9 CIS (CARCINOMA IN SITU): Status: ACTIVE | Noted: 2024-07-15

## 2024-07-16 ENCOUNTER — APPOINTMENT (OUTPATIENT)
Dept: GYNECOLOGIC ONCOLOGY | Facility: CLINIC | Age: 34
End: 2024-07-16

## 2024-07-16 DIAGNOSIS — D09.9 CARCINOMA IN SITU, UNSPECIFIED: ICD-10-CM

## 2024-08-27 ENCOUNTER — NON-APPOINTMENT (OUTPATIENT)
Age: 34
End: 2024-08-27

## 2024-09-15 ENCOUNTER — NON-APPOINTMENT (OUTPATIENT)
Age: 34
End: 2024-09-15

## 2024-09-16 ENCOUNTER — TRANSCRIPTION ENCOUNTER (OUTPATIENT)
Age: 34
End: 2024-09-16

## 2024-09-16 ENCOUNTER — OUTPATIENT (OUTPATIENT)
Dept: OUTPATIENT SERVICES | Facility: HOSPITAL | Age: 34
LOS: 1 days | End: 2024-09-16

## 2024-09-16 ENCOUNTER — APPOINTMENT (OUTPATIENT)
Dept: INTERNAL MEDICINE | Facility: CLINIC | Age: 34
End: 2024-09-16

## 2024-09-16 DIAGNOSIS — Z98.890 OTHER SPECIFIED POSTPROCEDURAL STATES: Chronic | ICD-10-CM

## 2024-09-17 ENCOUNTER — TRANSCRIPTION ENCOUNTER (OUTPATIENT)
Age: 34
End: 2024-09-17

## 2024-09-30 ENCOUNTER — TRANSCRIPTION ENCOUNTER (OUTPATIENT)
Age: 34
End: 2024-09-30

## 2024-11-17 ENCOUNTER — NON-APPOINTMENT (OUTPATIENT)
Age: 34
End: 2024-11-17

## 2024-12-22 ENCOUNTER — NON-APPOINTMENT (OUTPATIENT)
Age: 34
End: 2024-12-22

## 2024-12-22 ENCOUNTER — EMERGENCY (EMERGENCY)
Facility: HOSPITAL | Age: 34
LOS: 1 days | Discharge: HOME | End: 2024-12-22
Attending: EMERGENCY MEDICINE | Admitting: EMERGENCY MEDICINE
Payer: COMMERCIAL

## 2024-12-22 VITALS
HEART RATE: 93 BPM | TEMPERATURE: 98 F | DIASTOLIC BLOOD PRESSURE: 79 MMHG | WEIGHT: 166.01 LBS | OXYGEN SATURATION: 100 % | RESPIRATION RATE: 17 BRPM | SYSTOLIC BLOOD PRESSURE: 116 MMHG

## 2024-12-22 VITALS
RESPIRATION RATE: 16 BRPM | DIASTOLIC BLOOD PRESSURE: 60 MMHG | HEART RATE: 90 BPM | TEMPERATURE: 98 F | SYSTOLIC BLOOD PRESSURE: 102 MMHG | OXYGEN SATURATION: 100 %

## 2024-12-22 DIAGNOSIS — Z98.890 OTHER SPECIFIED POSTPROCEDURAL STATES: Chronic | ICD-10-CM

## 2024-12-22 LAB
ALBUMIN SERPL ELPH-MCNC: 4.7 G/DL — SIGNIFICANT CHANGE UP (ref 3.3–5)
ALP SERPL-CCNC: 88 U/L — SIGNIFICANT CHANGE UP (ref 40–120)
ALT FLD-CCNC: 24 U/L — SIGNIFICANT CHANGE UP (ref 4–33)
ANION GAP SERPL CALC-SCNC: 12 MMOL/L — SIGNIFICANT CHANGE UP (ref 7–14)
APTT BLD: 32.6 SEC — SIGNIFICANT CHANGE UP (ref 24.5–35.6)
AST SERPL-CCNC: 18 U/L — SIGNIFICANT CHANGE UP (ref 4–32)
BASOPHILS # BLD AUTO: 0.01 K/UL — SIGNIFICANT CHANGE UP (ref 0–0.2)
BASOPHILS NFR BLD AUTO: 0.1 % — SIGNIFICANT CHANGE UP (ref 0–2)
BILIRUB SERPL-MCNC: 0.4 MG/DL — SIGNIFICANT CHANGE UP (ref 0.2–1.2)
BUN SERPL-MCNC: 12 MG/DL — SIGNIFICANT CHANGE UP (ref 7–23)
CALCIUM SERPL-MCNC: 9.5 MG/DL — SIGNIFICANT CHANGE UP (ref 8.4–10.5)
CHLORIDE SERPL-SCNC: 103 MMOL/L — SIGNIFICANT CHANGE UP (ref 98–107)
CO2 SERPL-SCNC: 25 MMOL/L — SIGNIFICANT CHANGE UP (ref 22–31)
CREAT SERPL-MCNC: 0.62 MG/DL — SIGNIFICANT CHANGE UP (ref 0.5–1.3)
EGFR: 121 ML/MIN/1.73M2 — SIGNIFICANT CHANGE UP
EOSINOPHIL # BLD AUTO: 0.07 K/UL — SIGNIFICANT CHANGE UP (ref 0–0.5)
EOSINOPHIL NFR BLD AUTO: 0.8 % — SIGNIFICANT CHANGE UP (ref 0–6)
FLUAV AG NPH QL: SIGNIFICANT CHANGE UP
FLUBV AG NPH QL: SIGNIFICANT CHANGE UP
GLUCOSE SERPL-MCNC: 82 MG/DL — SIGNIFICANT CHANGE UP (ref 70–99)
HCG SERPL-ACNC: <1 MIU/ML — SIGNIFICANT CHANGE UP
HCT VFR BLD CALC: 38.7 % — SIGNIFICANT CHANGE UP (ref 34.5–45)
HGB BLD-MCNC: 12.6 G/DL — SIGNIFICANT CHANGE UP (ref 11.5–15.5)
IANC: 5.26 K/UL — SIGNIFICANT CHANGE UP (ref 1.8–7.4)
IMM GRANULOCYTES NFR BLD AUTO: 0.2 % — SIGNIFICANT CHANGE UP (ref 0–0.9)
INR BLD: 1 RATIO — SIGNIFICANT CHANGE UP (ref 0.85–1.16)
LYMPHOCYTES # BLD AUTO: 3.46 K/UL — HIGH (ref 1–3.3)
LYMPHOCYTES # BLD AUTO: 37.3 % — SIGNIFICANT CHANGE UP (ref 13–44)
MCHC RBC-ENTMCNC: 29.3 PG — SIGNIFICANT CHANGE UP (ref 27–34)
MCHC RBC-ENTMCNC: 32.6 G/DL — SIGNIFICANT CHANGE UP (ref 32–36)
MCV RBC AUTO: 90 FL — SIGNIFICANT CHANGE UP (ref 80–100)
MONOCYTES # BLD AUTO: 0.45 K/UL — SIGNIFICANT CHANGE UP (ref 0–0.9)
MONOCYTES NFR BLD AUTO: 4.9 % — SIGNIFICANT CHANGE UP (ref 2–14)
NEUTROPHILS # BLD AUTO: 5.26 K/UL — SIGNIFICANT CHANGE UP (ref 1.8–7.4)
NEUTROPHILS NFR BLD AUTO: 56.7 % — SIGNIFICANT CHANGE UP (ref 43–77)
NRBC # BLD: 0 /100 WBCS — SIGNIFICANT CHANGE UP (ref 0–0)
NRBC # FLD: 0 K/UL — SIGNIFICANT CHANGE UP (ref 0–0)
NT-PROBNP SERPL-SCNC: <36 PG/ML — SIGNIFICANT CHANGE UP
PLATELET # BLD AUTO: 293 K/UL — SIGNIFICANT CHANGE UP (ref 150–400)
POTASSIUM SERPL-MCNC: 4.1 MMOL/L — SIGNIFICANT CHANGE UP (ref 3.5–5.3)
POTASSIUM SERPL-SCNC: 4.1 MMOL/L — SIGNIFICANT CHANGE UP (ref 3.5–5.3)
PROT SERPL-MCNC: 8.4 G/DL — HIGH (ref 6–8.3)
PROTHROM AB SERPL-ACNC: 11.9 SEC — SIGNIFICANT CHANGE UP (ref 9.9–13.4)
RBC # BLD: 4.3 M/UL — SIGNIFICANT CHANGE UP (ref 3.8–5.2)
RBC # FLD: 12.1 % — SIGNIFICANT CHANGE UP (ref 10.3–14.5)
RSV RNA NPH QL NAA+NON-PROBE: SIGNIFICANT CHANGE UP
SARS-COV-2 RNA SPEC QL NAA+PROBE: SIGNIFICANT CHANGE UP
SODIUM SERPL-SCNC: 140 MMOL/L — SIGNIFICANT CHANGE UP (ref 135–145)
TROPONIN T, HIGH SENSITIVITY RESULT: <6 NG/L — SIGNIFICANT CHANGE UP
WBC # BLD: 9.27 K/UL — SIGNIFICANT CHANGE UP (ref 3.8–10.5)
WBC # FLD AUTO: 9.27 K/UL — SIGNIFICANT CHANGE UP (ref 3.8–10.5)

## 2024-12-22 PROCEDURE — 71275 CT ANGIOGRAPHY CHEST: CPT | Mod: 26,MC

## 2024-12-22 PROCEDURE — 93010 ELECTROCARDIOGRAM REPORT: CPT

## 2024-12-22 PROCEDURE — 99285 EMERGENCY DEPT VISIT HI MDM: CPT

## 2024-12-22 RX ORDER — ACETAMINOPHEN 500MG 500 MG/1
1000 TABLET, COATED ORAL ONCE
Refills: 0 | Status: COMPLETED | OUTPATIENT
Start: 2024-12-22 | End: 2024-12-22

## 2024-12-22 RX ORDER — KETOROLAC TROMETHAMINE 30 MG/ML
15 INJECTION INTRAMUSCULAR; INTRAVENOUS ONCE
Refills: 0 | Status: DISCONTINUED | OUTPATIENT
Start: 2024-12-22 | End: 2024-12-22

## 2024-12-22 RX ADMIN — ACETAMINOPHEN 500MG 400 MILLIGRAM(S): 500 TABLET, COATED ORAL at 16:05

## 2024-12-22 RX ADMIN — KETOROLAC TROMETHAMINE 15 MILLIGRAM(S): 30 INJECTION INTRAMUSCULAR; INTRAVENOUS at 16:05

## 2024-12-22 NOTE — ED ADULT NURSE NOTE - HOW OFTEN DO YOU HAVE A DRINK CONTAINING ALCOHOL?
EXAM DESCRIPTION:  RAD - Chest Pa And Lat (2 Views) - 4/14/2018 7:32 pm

 

CLINICAL HISTORY:  Cough, fever, congestion

 

COMPARISON:  November 2017

 

TECHNIQUE:  AP and lateral views obtained.

 

FINDINGS:  The lungs are normal volume. Lateral view has substantial motion degradation. There is mot
ion degradation at both lung bases on the frontal projection. Trachea is midline.  No focal consolida
tions seen. Lung markings are not outside of normal range for the amount of motion present. Viral inf
iltrate is still possible. No focal abnormality that would raise probability of bacterial pneumonia.

 

 Heart size is normal and central vasculature is within normal limits.  No pleural effusion or pneumo
thorax seen.  No acute bony finding noted.  No aortic abnormality.

 

IMPRESSION:  Motion degraded study shows no focal consolidation to localize a bacterial pneumonia.

 

Perihilar markings are accentuated by motion. Viral infiltrate is not excluded. Never

## 2024-12-22 NOTE — ED PROVIDER NOTE - PHYSICAL EXAMINATION
GEN - ao3, nontoxic, uncomfortable 2/2 pain  HEAD - NC/AT   EYES- PERRL, EOMI  ENT: Airway patent, mmm, Oral cavity and pharynx normal. No inflammation, swelling, exudate, or lesions.    NECK: Neck supple  PULMONARY - CTA b/l, symmetric breath sounds, unlabored, nl o2 sat on ra, .   CARDIAC -s1s2, RRR, no M,G,R, +ttp to left chest wall, no crepitus, no rash  ABDOMEN - +BS, ND, NT, soft, no guarding, no rebound, no masses   BACK - no CVA tenderness, Normal  spine, no midline ttp, +ttp to left scapular region and left thoracic paraspinal region, no rash  EXTREMITIES - FROM, symmetric pulses, capillary refill < 2 seconds, no edema   SKIN - no rash or bruising   NEUROLOGIC - alert, speech clear, no focal deficits  PSYCH -nl mood/affect, nl insight.

## 2024-12-22 NOTE — ED ADULT NURSE NOTE - NSFALLUNIVINTERV_ED_ALL_ED
Bed/Stretcher in lowest position, wheels locked, appropriate side rails in place/Call bell, personal items and telephone in reach/Instruct patient to call for assistance before getting out of bed/chair/stretcher/Non-slip footwear applied when patient is off stretcher/[place] to call system/Physically safe environment - no spills, clutter or unnecessary equipment/Purposeful proactive rounding/Room/bathroom lighting operational, light cord in reach

## 2024-12-22 NOTE — ED PROVIDER NOTE - OBJECTIVE STATEMENT
34-year-old female history of uterine cancer status post hysterectomy presenting to the emergency department with concern for initial onset of left upper back pain which then became associated with left-sided chest pain, is worse with movement, positional changes and deep breathing.  No fevers or chills, no cough or congestion. No nausea vomiting abdominal pain diarrhea dysuria melena or BRBPR.  No edema.  Reports that the pain started mild in the left upper back a few days ago, and then became more severe to the left chest wall and worse with lying down.  She reports when she is upright she does not feel difficulty breathing however when she lies down she feels short of breath secondary to pain with breathing.

## 2024-12-22 NOTE — ED PROVIDER NOTE - CLINICAL SUMMARY MEDICAL DECISION MAKING FREE TEXT BOX
34-year-old female history of uterine cancer status post hysterectomy presenting to the emergency department with concern for initial onset of left upper back pain which then became associated with left-sided chest pain, is worse with movement, positional changes and deep breathing, ongoing for last few days. On exam has pain reproduced on palpation of left chest and left upper back as well as laying on left side and moving shoulder/trunk. Breathing is unlabored, o2 sat wnl. Plan for labs, ekg, cta, pain meds. Exam c/w msk cause however given location, pleuritic, and pmh, eval for alt differential including but not limited to pe, ptx, pna, effusion, pulm edema,

## 2024-12-22 NOTE — ED PROVIDER NOTE - NSFOLLOWUPINSTRUCTIONS_ED_ALL_ED_FT
You were seen in the emergency department for chest pain . We have evaluated you and determined that you do not require further hospital interventions.    Please follow up with your primary care provider as necessary to discuss the results of your stay in our department.    If you start to experience worsening symptoms such as shortness of breath, worsening chest pain , please return to the emergency department for further evaluation.    Chest Pain    WHAT YOU NEED TO KNOW:    Chest pain can be caused by a range of conditions, from not serious to life-threatening. Chest pain can be a symptom of a digestive problem, such as acid reflux or a stomach ulcer. An anxiety attack or a strong emotion, such as anger, can also cause chest pain. Infection, inflammation, or a fracture in the bones or cartilage in your chest can cause pain or discomfort. Sometimes chest pain or pressure is caused by poor blood flow to your heart (angina). Chest pain may also be caused by life-threatening conditions such as a heart attack or blood clot in your lungs.    DISCHARGE INSTRUCTIONS:    Call your local emergency number (911 in the [place]) or have someone call if:    You have any of the following signs of a heart attack:  Squeezing, pressure, or pain in your chest    You may also have any of the following:  Discomfort or pain in your back, neck, jaw, stomach, or arm    Shortness of breath    Nausea or vomiting    Lightheadedness or a sudden cold sweat    Seek care immediately if:    You have chest discomfort that gets worse, even with medicine.    You cough or vomit blood.    Your bowel movements are black or bloody.    You cannot stop vomiting, or it hurts to swallow.  Call your doctor if:    You have questions or concerns about your condition or care.    Medicines:    Medicines may be given to treat the cause of your chest pain. Examples include pain medicine, anxiety medicine, or medicines to increase blood flow to your heart.    Do not take certain medicines without asking your healthcare provider first. These include NSAIDs, herbal or vitamin supplements, or hormones (estrogen or progestin).    Take your medicine as directed. Contact your healthcare provider if you think your medicine is not helping or if you have side effects. Tell him or her if you are allergic to any medicine. Keep a list of the medicines, vitamins, and herbs you take. Include the amounts, and when and why you take them. Bring the list or the pill bottles to follow-up visits. Carry your medicine list with you in case of an emergency.  Healthy living tips: The following are general healthy guidelines. If the cause of your chest pain is known, your healthcare provider will give you specific guidelines to follow.    Do not smoke. Nicotine and other chemicals in cigarettes and cigars can cause lung and heart damage. Ask your healthcare provider for information if you currently smoke and need help to quit. E-cigarettes or smokeless tobacco still contain nicotine. Talk to your healthcare provider before you use these products.    Choose a variety of healthy foods as often as possible. Include fresh, frozen, or canned fruits and vegetables. Also include low-fat dairy products, fish, chicken (without skin), and lean meats. Your healthcare provider or a dietitian can help you create meal plans. You may need to avoid certain foods or drinks if your pain is caused by a digestion problem.  Healthy Foods      Lower your sodium (salt) intake. Limit foods that are high in sodium, such as canned foods, salty snacks, and cold cuts. If you add salt when you cook food, do not add more at the table. Choose low-sodium canned foods as much as possible.        Drink plenty of water every day. Water helps your body to control temperature and blood pressure. Ask your healthcare provider how much water you should drink every day.    Ask about activity. Your healthcare provider will tell you which activities to limit or avoid. Ask when you can drive, return to work, and have sex. Ask about the best exercise plan for you.    Maintain a healthy weight. Ask your healthcare provider what a healthy weight is for you. Ask him or her to help you create a safe weight loss plan if you are overweight.    Ask about vaccines you may need. Get the influenza (flu) vaccine every year as soon as recommended, usually in September or October. You may also need a pneumococcal vaccine to prevent pneumonia. The vaccine is usually given every 5 years, starting at age 65. Your healthcare provider can tell you if should get other vaccines, and when to get them.  Follow up with your healthcare provider within 72 hours, or as directed: You may need to return for more tests to find the cause of your chest pain. You may be referred to a specialist, such as a cardiologist or gastroenterologist. Write down your questions so you remember to ask them during your visits.

## 2024-12-22 NOTE — ED ADULT NURSE NOTE - OBJECTIVE STATEMENT
patient  Alert & ox4, complains of  L upper back pain and chest pain with SOB, [facility] and sent to ED to r/o PE pt . evaluated by MD.Placed 20g angiocath right  AC., labs drawn and sent.  patient will be waiting for results, further evaluation and disposition.  made comfortable.will continue to monitor.

## 2024-12-22 NOTE — ED PROVIDER NOTE - PROGRESS NOTE DETAILS
Olivier carr DO   Patient stated improvement of symptoms and understanding of return precautions. Will dc

## 2024-12-25 ENCOUNTER — NON-APPOINTMENT (OUTPATIENT)
Age: 34
End: 2024-12-25

## 2025-01-08 ENCOUNTER — NON-APPOINTMENT (OUTPATIENT)
Age: 35
End: 2025-01-08

## 2025-05-29 NOTE — ED ADULT TRIAGE NOTE - TEMPERATURE IN FAHRENHEIT (DEGREES F)
Attempted to call patient regarding upcoming visit with Dr. Dougherty. This will need to be rescheduled. Unable to leave voicemail.    Will attempt tomorrow.  Marilou De Jesus RN    
[phone]

## 2025-07-07 NOTE — ED PROVIDER NOTE - NSCAREINITIATED _GEN_ER
Called patient to discuss ordered stress test and answer questions regarding necessity of test.      RM       This was resolved on 07/01/2025 via phone, per me.    Camilo ClinePA)

## (undated) DEVICE — UTERINE MANIPULATOR CONMED VCARE MED 34MM

## (undated) DEVICE — ELCTR GROUNDING PAD ADULT COVIDIEN

## (undated) DEVICE — SUT VICRYL PLUS 2-0 27" CT-3

## (undated) DEVICE — WARMING BLANKET UPPER ADULT

## (undated) DEVICE — GOWN XL

## (undated) DEVICE — UTERINE MANIPULATOR MPM MEDICAL ZUMI 4.5MM

## (undated) DEVICE — ELCTR BOVIE PENCIL SMOKE EVACUATION

## (undated) DEVICE — BASIN SET SINGLE

## (undated) DEVICE — FOR-ESU VALLEYLAB T7E14831DX: Type: DURABLE MEDICAL EQUIPMENT

## (undated) DEVICE — SPECULUM VAGINAL MED DISP

## (undated) DEVICE — DRAPE LEGGINGS 6" CUFF 28X43"

## (undated) DEVICE — TUBING AIRSEAL TRI-LUMEN FILTERED

## (undated) DEVICE — GLV 6.5 PROTEXIS (WHITE)

## (undated) DEVICE — XI SEAL UNIVERSIAL 5-12MM

## (undated) DEVICE — TUBING STRYKEFLOW II SUCTION / IRRIGATOR

## (undated) DEVICE — POSITIONER PURPLE ARM ONE STEP (LARGE)

## (undated) DEVICE — XI DRAPE ARM

## (undated) DEVICE — SUT VLOC 180 0 9" GS-21 GREEN

## (undated) DEVICE — TROCAR SURGIQUEST AIRSEAL 8MMX100MM

## (undated) DEVICE — TRAP SPECIMEN SPUTUM 40CC

## (undated) DEVICE — SUCTION YANKAUER OPEN TIP NO VENT CURVE

## (undated) DEVICE — XI TIP COVER

## (undated) DEVICE — SUT POLYSORB 0 60" TIES UNDYED

## (undated) DEVICE — XI ARM FORCEP PROGRASP 8MM

## (undated) DEVICE — LAP PAD W RING 18 X 18"

## (undated) DEVICE — SYR LUER LOK 10CC

## (undated) DEVICE — XI ARM NEEDLE DRIVER LARGE

## (undated) DEVICE — POSITIONER STRAP ARMBOARD VELCRO TS-30

## (undated) DEVICE — NDL HYPO SAFE 25G X 1.5" (ORANGE)

## (undated) DEVICE — PACK PERI GYN

## (undated) DEVICE — PACK MINOR NO DRAPE

## (undated) DEVICE — GLV 6 PROTEXIS (WHITE)

## (undated) DEVICE — PACK LITHOTOMY

## (undated) DEVICE — SUT VICRYL PLUS 0 27" UR-6

## (undated) DEVICE — SUT VLOC 180 3-0 9" V-20 GREEN

## (undated) DEVICE — APPLICATOR Q TIP 6" WOOD STEM

## (undated) DEVICE — SOL IRR POUR NS 0.9% 1500ML

## (undated) DEVICE — XI ARM NEEDLE DRIVER MEGA

## (undated) DEVICE — LUBRICANT INST ELECTROLUBE Z SOLUTION

## (undated) DEVICE — DRSG GAUZE PACKTNER ROLL

## (undated) DEVICE — VENODYNE/SCD SLEEVE CALF MEDIUM

## (undated) DEVICE — DRSG DERMABOND 0.7ML

## (undated) DEVICE — LUBRICATING JELLY ONESHOT 1.25OZ

## (undated) DEVICE — ENDOCATCH 10MM SPECIMEN POUCH

## (undated) DEVICE — UTERINE MANIPULATOR CONMED VCARE LG 37MM

## (undated) DEVICE — SUT MONOCRYL 4-0 27" PS-2 UNDYED

## (undated) DEVICE — XI ARM FORCEP FENESTRATED BIPOLAR 8MM

## (undated) DEVICE — FOLEY TRAY 16FR 5CC LF UMETER CLOSED

## (undated) DEVICE — APPLICATOR VISTASEAL LAP DUAL 35CM RIGID

## (undated) DEVICE — POSITIONER FOAM HEAD CRADLE (PINK)

## (undated) DEVICE — PACK D&C

## (undated) DEVICE — SUCTION YANKAUER NO CONTROL VENT

## (undated) DEVICE — XI DRAPE COLUMN

## (undated) DEVICE — SUT VICRYL PLUS 2-0 27" SH UNDYED

## (undated) DEVICE — D HELP - CLEARVIEW CLEARIFY SYSTEM

## (undated) DEVICE — XI VESSEL SEALER

## (undated) DEVICE — SUT VICRYL 0 27" CT-2 UNDYED

## (undated) DEVICE — PACK ROBOTIC LIJ

## (undated) DEVICE — Device

## (undated) DEVICE — BLADE SURGICAL #11 CARBON

## (undated) DEVICE — XI ARM SCISSOR MONO CURVED

## (undated) DEVICE — PROTECTOR HEEL / ELBOW

## (undated) DEVICE — UTERINE MANIPULATOR CONMED VCARE SM 32MM

## (undated) DEVICE — SUT VLOC 180 2-0 6" GS-22 GREEN

## (undated) DEVICE — ELCTR BOVIE PENCIL BLADE 10FT

## (undated) DEVICE — DRAPE LIGHT HANDLE COVER (BLUE)

## (undated) DEVICE — TUBING SUCTION NONCONDUCTIVE 6MM X 12FT

## (undated) DEVICE — POSITIONER PINK PAD PIGAZZI SYSTEM

## (undated) DEVICE — SOL IRR POUR H2O 500ML

## (undated) DEVICE — GEL AQUSNC PACKET 20GR

## (undated) DEVICE — TROCAR SURGIQUEST AIRSEAL 5MM X 100MM

## (undated) DEVICE — ELCTR E/S BALL MED 3MMX13CM